# Patient Record
Sex: FEMALE | Race: WHITE | NOT HISPANIC OR LATINO | Employment: FULL TIME | URBAN - METROPOLITAN AREA
[De-identification: names, ages, dates, MRNs, and addresses within clinical notes are randomized per-mention and may not be internally consistent; named-entity substitution may affect disease eponyms.]

---

## 2017-06-06 ENCOUNTER — HOSPITAL ENCOUNTER (OUTPATIENT)
Dept: MAMMOGRAPHY | Facility: CLINIC | Age: 52
Discharge: HOME | End: 2017-06-06

## 2017-06-06 DIAGNOSIS — Z12.31 VISIT FOR SCREENING MAMMOGRAM: ICD-10-CM

## 2018-09-04 ENCOUNTER — HOSPITAL ENCOUNTER (OUTPATIENT)
Dept: MAMMOGRAPHY | Facility: CLINIC | Age: 53
Discharge: HOME | End: 2018-09-04

## 2018-09-04 DIAGNOSIS — Z12.31 VISIT FOR SCREENING MAMMOGRAM: ICD-10-CM

## 2019-09-12 ENCOUNTER — HOSPITAL ENCOUNTER (OUTPATIENT)
Dept: MAMMOGRAPHY | Facility: CLINIC | Age: 54
Discharge: HOME | End: 2019-09-12

## 2019-09-12 DIAGNOSIS — Z12.31 VISIT FOR SCREENING MAMMOGRAM: ICD-10-CM

## 2020-09-24 ENCOUNTER — HOSPITAL ENCOUNTER (OUTPATIENT)
Dept: MAMMOGRAPHY | Facility: CLINIC | Age: 55
Discharge: HOME | End: 2020-09-24

## 2020-09-24 DIAGNOSIS — Z12.31 VISIT FOR SCREENING MAMMOGRAM: ICD-10-CM

## 2021-07-22 ENCOUNTER — RECORDS - HEALTHEAST (OUTPATIENT)
Dept: SCHEDULING | Facility: CLINIC | Age: 56
End: 2021-07-22

## 2021-07-22 DIAGNOSIS — Z12.31 OTHER SCREENING MAMMOGRAM: ICD-10-CM

## 2021-10-27 ENCOUNTER — ANCILLARY PROCEDURE (OUTPATIENT)
Dept: MAMMOGRAPHY | Facility: CLINIC | Age: 56
End: 2021-10-27
Attending: INTERNAL MEDICINE
Payer: COMMERCIAL

## 2021-10-27 DIAGNOSIS — Z12.31 VISIT FOR SCREENING MAMMOGRAM: ICD-10-CM

## 2021-10-27 PROCEDURE — 77063 BREAST TOMOSYNTHESIS BI: CPT

## 2021-10-29 DIAGNOSIS — Z11.59 ENCOUNTER FOR SCREENING FOR OTHER VIRAL DISEASES: ICD-10-CM

## 2021-11-11 ENCOUNTER — LAB (OUTPATIENT)
Dept: LAB | Facility: CLINIC | Age: 56
End: 2021-11-11
Payer: COMMERCIAL

## 2021-11-11 DIAGNOSIS — Z11.59 ENCOUNTER FOR SCREENING FOR OTHER VIRAL DISEASES: ICD-10-CM

## 2021-11-11 PROCEDURE — U0003 INFECTIOUS AGENT DETECTION BY NUCLEIC ACID (DNA OR RNA); SEVERE ACUTE RESPIRATORY SYNDROME CORONAVIRUS 2 (SARS-COV-2) (CORONAVIRUS DISEASE [COVID-19]), AMPLIFIED PROBE TECHNIQUE, MAKING USE OF HIGH THROUGHPUT TECHNOLOGIES AS DESCRIBED BY CMS-2020-01-R: HCPCS

## 2021-11-11 PROCEDURE — U0005 INFEC AGEN DETEC AMPLI PROBE: HCPCS

## 2021-11-11 RX ORDER — PSEUDOEPHEDRINE HCL 30 MG
30 TABLET ORAL EVERY MORNING
COMMUNITY

## 2021-11-11 RX ORDER — PROGESTERONE 200 MG/1
200 CAPSULE ORAL AT BEDTIME
COMMUNITY
Start: 2021-10-15

## 2021-11-11 RX ORDER — LORATADINE 10 MG/1
10 TABLET ORAL DAILY
COMMUNITY

## 2021-11-11 RX ORDER — ASPIRIN 81 MG/1
81 TABLET, CHEWABLE ORAL
COMMUNITY

## 2021-11-11 RX ORDER — LISINOPRIL 20 MG/1
20 TABLET ORAL DAILY
COMMUNITY
Start: 2021-09-08

## 2021-11-11 ASSESSMENT — MIFFLIN-ST. JEOR: SCORE: 1305.76

## 2021-11-12 ENCOUNTER — ANESTHESIA EVENT (OUTPATIENT)
Dept: SURGERY | Facility: AMBULATORY SURGERY CENTER | Age: 56
End: 2021-11-12
Payer: COMMERCIAL

## 2021-11-12 LAB — SARS-COV-2 RNA RESP QL NAA+PROBE: NEGATIVE

## 2021-11-15 ENCOUNTER — HOSPITAL ENCOUNTER (OUTPATIENT)
Facility: AMBULATORY SURGERY CENTER | Age: 56
End: 2021-11-15
Attending: OBSTETRICS & GYNECOLOGY
Payer: COMMERCIAL

## 2021-11-15 ENCOUNTER — ANESTHESIA (OUTPATIENT)
Dept: SURGERY | Facility: AMBULATORY SURGERY CENTER | Age: 56
End: 2021-11-15
Payer: COMMERCIAL

## 2021-11-15 VITALS
RESPIRATION RATE: 16 BRPM | SYSTOLIC BLOOD PRESSURE: 131 MMHG | OXYGEN SATURATION: 97 % | BODY MASS INDEX: 27.31 KG/M2 | HEIGHT: 64 IN | HEART RATE: 67 BPM | TEMPERATURE: 97.6 F | WEIGHT: 160 LBS | DIASTOLIC BLOOD PRESSURE: 77 MMHG

## 2021-11-15 DIAGNOSIS — Z98.890 S/P D&C (STATUS POST DILATION AND CURETTAGE): Primary | ICD-10-CM

## 2021-11-15 DIAGNOSIS — N95.0 PMB (POSTMENOPAUSAL BLEEDING): ICD-10-CM

## 2021-11-15 LAB
GLUCOSE POCT: 117 MG/DL (ref 70–99)
GLUCOSE SERPL-MCNC: 132 MG/DL (ref 70–99)

## 2021-11-15 RX ORDER — OXYCODONE HYDROCHLORIDE 5 MG/1
5-10 TABLET ORAL EVERY 6 HOURS PRN
Qty: 4 TABLET | Refills: 0 | Status: SHIPPED | OUTPATIENT
Start: 2021-11-15 | End: 2022-01-12

## 2021-11-15 RX ORDER — BUPIVACAINE HYDROCHLORIDE 5 MG/ML
INJECTION, SOLUTION PERINEURAL PRN
Status: DISCONTINUED | OUTPATIENT
Start: 2021-11-15 | End: 2021-11-15 | Stop reason: HOSPADM

## 2021-11-15 RX ORDER — PROPOFOL 10 MG/ML
INJECTION, EMULSION INTRAVENOUS PRN
Status: DISCONTINUED | OUTPATIENT
Start: 2021-11-15 | End: 2021-11-15

## 2021-11-15 RX ORDER — IBUPROFEN 800 MG/1
800 TABLET, FILM COATED ORAL ONCE
Status: CANCELLED | OUTPATIENT
Start: 2021-11-15 | End: 2021-11-15

## 2021-11-15 RX ORDER — ACETAMINOPHEN 325 MG/1
975 TABLET ORAL ONCE
Status: CANCELLED | OUTPATIENT
Start: 2021-11-15 | End: 2021-11-15

## 2021-11-15 RX ORDER — SODIUM CHLORIDE, SODIUM LACTATE, POTASSIUM CHLORIDE, CALCIUM CHLORIDE 600; 310; 30; 20 MG/100ML; MG/100ML; MG/100ML; MG/100ML
INJECTION, SOLUTION INTRAVENOUS CONTINUOUS
Status: DISCONTINUED | OUTPATIENT
Start: 2021-11-15 | End: 2021-11-16 | Stop reason: HOSPADM

## 2021-11-15 RX ORDER — FENTANYL CITRATE 50 UG/ML
25 INJECTION, SOLUTION INTRAMUSCULAR; INTRAVENOUS
Status: DISCONTINUED | OUTPATIENT
Start: 2021-11-15 | End: 2021-11-16 | Stop reason: HOSPADM

## 2021-11-15 RX ORDER — MEPERIDINE HYDROCHLORIDE 25 MG/ML
12.5 INJECTION INTRAMUSCULAR; INTRAVENOUS; SUBCUTANEOUS
Status: DISCONTINUED | OUTPATIENT
Start: 2021-11-15 | End: 2021-11-16 | Stop reason: HOSPADM

## 2021-11-15 RX ORDER — ONDANSETRON 2 MG/ML
4 INJECTION INTRAMUSCULAR; INTRAVENOUS EVERY 30 MIN PRN
Status: DISCONTINUED | OUTPATIENT
Start: 2021-11-15 | End: 2021-11-16 | Stop reason: HOSPADM

## 2021-11-15 RX ORDER — LIDOCAINE HYDROCHLORIDE 20 MG/ML
INJECTION, SOLUTION INFILTRATION; PERINEURAL PRN
Status: DISCONTINUED | OUTPATIENT
Start: 2021-11-15 | End: 2021-11-15

## 2021-11-15 RX ORDER — ONDANSETRON 4 MG/1
4 TABLET, ORALLY DISINTEGRATING ORAL EVERY 30 MIN PRN
Status: DISCONTINUED | OUTPATIENT
Start: 2021-11-15 | End: 2021-11-16 | Stop reason: HOSPADM

## 2021-11-15 RX ORDER — PROPOFOL 10 MG/ML
INJECTION, EMULSION INTRAVENOUS CONTINUOUS PRN
Status: DISCONTINUED | OUTPATIENT
Start: 2021-11-15 | End: 2021-11-15

## 2021-11-15 RX ORDER — KETOROLAC TROMETHAMINE 15 MG/ML
INJECTION, SOLUTION INTRAMUSCULAR; INTRAVENOUS PRN
Status: DISCONTINUED | OUTPATIENT
Start: 2021-11-15 | End: 2021-11-15

## 2021-11-15 RX ORDER — FENTANYL CITRATE 50 UG/ML
25 INJECTION, SOLUTION INTRAMUSCULAR; INTRAVENOUS EVERY 5 MIN PRN
Status: DISCONTINUED | OUTPATIENT
Start: 2021-11-15 | End: 2021-11-16 | Stop reason: HOSPADM

## 2021-11-15 RX ORDER — ONDANSETRON 2 MG/ML
INJECTION INTRAMUSCULAR; INTRAVENOUS PRN
Status: DISCONTINUED | OUTPATIENT
Start: 2021-11-15 | End: 2021-11-15

## 2021-11-15 RX ORDER — DEXAMETHASONE SODIUM PHOSPHATE 4 MG/ML
INJECTION, SOLUTION INTRA-ARTICULAR; INTRALESIONAL; INTRAMUSCULAR; INTRAVENOUS; SOFT TISSUE PRN
Status: DISCONTINUED | OUTPATIENT
Start: 2021-11-15 | End: 2021-11-15

## 2021-11-15 RX ORDER — FENTANYL CITRATE 50 UG/ML
INJECTION, SOLUTION INTRAMUSCULAR; INTRAVENOUS PRN
Status: DISCONTINUED | OUTPATIENT
Start: 2021-11-15 | End: 2021-11-15

## 2021-11-15 RX ORDER — HYDROMORPHONE HCL IN WATER/PF 6 MG/30 ML
0.2 PATIENT CONTROLLED ANALGESIA SYRINGE INTRAVENOUS EVERY 5 MIN PRN
Status: DISCONTINUED | OUTPATIENT
Start: 2021-11-15 | End: 2021-11-16 | Stop reason: HOSPADM

## 2021-11-15 RX ORDER — LIDOCAINE 40 MG/G
CREAM TOPICAL
Status: DISCONTINUED | OUTPATIENT
Start: 2021-11-15 | End: 2021-11-16 | Stop reason: HOSPADM

## 2021-11-15 RX ORDER — OXYCODONE HYDROCHLORIDE 5 MG/1
5 TABLET ORAL EVERY 4 HOURS PRN
Status: DISCONTINUED | OUTPATIENT
Start: 2021-11-15 | End: 2021-11-16 | Stop reason: HOSPADM

## 2021-11-15 RX ADMIN — DEXAMETHASONE SODIUM PHOSPHATE 4 MG: 4 INJECTION, SOLUTION INTRA-ARTICULAR; INTRALESIONAL; INTRAMUSCULAR; INTRAVENOUS; SOFT TISSUE at 11:52

## 2021-11-15 RX ADMIN — PROPOFOL 30 MG: 10 INJECTION, EMULSION INTRAVENOUS at 11:48

## 2021-11-15 RX ADMIN — FENTANYL CITRATE 50 MCG: 50 INJECTION, SOLUTION INTRAMUSCULAR; INTRAVENOUS at 11:53

## 2021-11-15 RX ADMIN — ONDANSETRON 4 MG: 2 INJECTION INTRAMUSCULAR; INTRAVENOUS at 12:05

## 2021-11-15 RX ADMIN — SODIUM CHLORIDE, SODIUM LACTATE, POTASSIUM CHLORIDE, CALCIUM CHLORIDE: 600; 310; 30; 20 INJECTION, SOLUTION INTRAVENOUS at 11:13

## 2021-11-15 RX ADMIN — KETOROLAC TROMETHAMINE 15 MG: 15 INJECTION, SOLUTION INTRAMUSCULAR; INTRAVENOUS at 12:07

## 2021-11-15 RX ADMIN — PROPOFOL 200 MCG/KG/MIN: 10 INJECTION, EMULSION INTRAVENOUS at 11:48

## 2021-11-15 RX ADMIN — PROPOFOL 30 MG: 10 INJECTION, EMULSION INTRAVENOUS at 11:51

## 2021-11-15 RX ADMIN — LIDOCAINE HYDROCHLORIDE 40 MG: 20 INJECTION, SOLUTION INFILTRATION; PERINEURAL at 11:45

## 2021-11-15 RX ADMIN — FENTANYL CITRATE 50 MCG: 50 INJECTION, SOLUTION INTRAMUSCULAR; INTRAVENOUS at 11:45

## 2021-11-15 ASSESSMENT — MIFFLIN-ST. JEOR: SCORE: 1305.76

## 2021-11-15 ASSESSMENT — ENCOUNTER SYMPTOMS: SEIZURES: 0

## 2021-11-15 NOTE — ANESTHESIA PREPROCEDURE EVALUATION
Anesthesia Pre-Procedure Evaluation    Patient: Ava Miranda   MRN: [id number] : 1965        Preoperative Diagnosis: PMB (postmenopausal bleeding) [N95.0]    Procedure : Procedure(s):  HYSTEROSCOPY, WITH DILATION AND CURETTAGE OF UTERUS          Past Medical History:   Diagnosis Date     Arthritis      Diabetes (H)     Type 2     Hypertension      Motion sickness      PONV (postoperative nausea and vomiting)       Past Surgical History:   Procedure Laterality Date     HC REMOVAL OF TONSILS,<14 Y/O      Description: Tonsillectomy;  Recorded: 2012;     LAPAROSCOPY       SHOULDER SURGERY        Allergies   Allergen Reactions     Penicillins Unknown     Annotation: unsure of reaction        Social History     Tobacco Use     Smoking status: Never Smoker     Smokeless tobacco: Never Used   Substance Use Topics     Alcohol use: Not Currently      Wt Readings from Last 1 Encounters:   21 72.6 kg (160 lb)        Anesthesia Evaluation   Pt has had prior anesthetic.     No history of anesthetic complications       ROS/MED HX  ENT/Pulmonary:  - neg pulmonary ROS  (-) sleep apnea   Neurologic:  - neg neurologic ROS  (-) no seizures and no CVA   Cardiovascular: Comment:  TTE  1. Echo  2021 11:14:00 AM.     2. The left ventricular chamber dimension is  normal and systolic   function   is normal  with a 3D ejection fraction of 61%.     3. There is mildly increased left ventricular wall thickness.     4. The right ventricular chamber dimension is normal and systolic   function   is normal.     5. There is trivial aortic valve regurgitation.     6. There is trace mitral valve regurgitation.     7. There is trace tricuspid valve regurgitation.     8. There is trace pulmonic regurgitation.     9. The proximal ascending aorta is mildly dilated measuring 4.00 cm with an index of 2.19 cm/m2.     10. No gross pericardial effusion.       (+) hypertension----- (-) CAD   METS/Exercise Tolerance:      Hematologic:  - neg hematologic  ROS     Musculoskeletal:  - neg musculoskeletal ROS     GI/Hepatic:  - neg GI/hepatic ROS     Renal/Genitourinary:  - neg Renal ROS     Endo:     (+) type II DM, Not using insulin,     Psychiatric/Substance Use:  - neg psychiatric ROS     Infectious Disease:  - neg infectious disease ROS  (-) Recent Fever   Malignancy:       Other:            Physical Exam    Airway  airway exam normal      Mallampati: II   TM distance: > 3 FB   Neck ROM: full   Mouth opening: > 3 cm    Respiratory Devices and Support         Dental  no notable dental history         Cardiovascular   cardiovascular exam normal       Rhythm and rate: regular and normal     Pulmonary   pulmonary exam normal        breath sounds clear to auscultation           OUTSIDE LABS:  CBC: No results found for: WBC, HGB, HCT, PLT  BMP: No results found for: NA, POTASSIUM, CHLORIDE, CO2, BUN, CR, GLC  COAGS: No results found for: PTT, INR, FIBR  POC: No results found for: BGM, HCG, HCGS  HEPATIC: No results found for: ALBUMIN, PROTTOTAL, ALT, AST, GGT, ALKPHOS, BILITOTAL, BILIDIRECT, TERESA  OTHER: No results found for: PH, LACT, A1C, ASH, PHOS, MAG, LIPASE, AMYLASE, TSH, T4, T3, CRP, SED    Anesthesia Plan    ASA Status:  2   NPO Status:  NPO Appropriate    Anesthesia Type: MAC.              Consents    Anesthesia Plan(s) and associated risks, benefits, and realistic alternatives discussed. Questions answered and patient/representative(s) expressed understanding.     - Discussed with:  Patient         Postoperative Care    Pain management: Oral pain medications, Multi-modal analgesia, IV analgesics.   PONV prophylaxis: Ondansetron (or other 5HT-3)     Comments:                Jesús Garsia MD

## 2021-11-15 NOTE — OP NOTE
Operative Note  Hysteroscopy, D&C    Name:  Ava Miranda  Location: [facility] OR  Procedure Date:  11/15/2021  PCP:  Marilou Pop      [unfilled]    Pre-Procedure Diagnosis:  PMB (postmenopausal bleeding) [N95.0]     Post-Procedure Diagnosis:    SAME    Surgeon(s):  Ping Hutchins MD      Anesthesia Type:  Monitor Anesthesia Care    Findings:  EUA: uterus anteverted, mobile, normal small size. No adnexal masses. Hysteroscopy: sound 7.5cm. Cavity normal size and contour, thin atrophic lining. No polyps or myomas. 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  Happened out of the blue; they were driving out of town.  Went to the ER; hgb normal, US also normal anteverted uterus with 4mm lining.  Saw Dr Anderson when returned to town; EMB normal. Recommended proceeding with hysteroscopy, D&C for thorough evaluation, as she is interested in staying on HRT long term.  She is interested in ultimately proceeding with hysterectomy so as to prevent any further episodes.  Risks and benefits of surgery discussed and consent obtained to proceed.    Operative Report:    After administration of heavy IV sedation, the patient was positioned in dorsolithotomy position and exam under anesthesia performed with findings as noted above.  She was prepped and draped in the usual sterile fashion.  The bladder was drained with a straight catheter and a speculum placed into the vagina to visualize the cervix.  The cervix was grasped with a single tooth tenaculum on the anterior lip.  Paracervical block placed with 10cc .5% marcaine. The uterus sounded to 7.5cm.  The cervix was dilated with the graded Hegar dilators to #7.  The hysteroscope was inserted into the endometrial cavity and hysteroscopy performed with findings as noted above.  The hysteroscope was removed.  The endometrial cavity was sharply curetted with return of a small amount of tissue.  A nice gritty texture was noted throughout. The vagina was swabbed clean.  Hysteroscopy performed again showing normal post curettage sampling of the endometrium.  All was hemostatic.  The hysteroscope was removed..  The vagina swabbed clean.  The tenaculum was removed.  The tenaculum site was hemostatic.  The speculum was removed from the vagina.  She was taken out of the lithotomy position.  Sponge and instrument counts were correct.  There were no complications.  She awakened from the anesthesia without difficulty and was transferred to the recovery room in good condition.          Ping Hutchins MD     Date: 11/15/2021  Time: 12:13 PM

## 2021-11-15 NOTE — DISCHARGE INSTRUCTIONS
You received a medication called Toradol (a stronger IV ibuprofen) at 12pm. Do NOT take any Ibuprofen / Advil / Motrin / Aleve / Naproxen or products containing Ibuprofen until 6pm or later.  You may take Ibuprofen 600mg every 6 hours with food after 6pm.    You may alternate Ibuprofen with 2 Extra strength Tylenol, 1000mg, every 6 hours as needed for pain.  Do not exceed 4000mg in a 24 hour period.    Call Dr Hutchins with any questions or concerns, [phone].

## 2021-11-15 NOTE — ANESTHESIA CARE TRANSFER NOTE
Patient: Ava Miranda    Procedure: Procedure(s):  HYSTEROSCOPY, WITH DILATION AND CURETTAGE OF UTERUS       Diagnosis: PMB (postmenopausal bleeding) [N95.0]  Diagnosis Additional Information: No value filed.    Anesthesia Type:   MAC     Note:    Oropharynx: oropharynx clear of all foreign objects and spontaneously breathing  Level of Consciousness: awake  Oxygen Supplementation: room air  Level of Supplemental Oxygen (L/min / FiO2): 0  Independent Airway: airway patency satisfactory and stable  Dentition: dentition unchanged  Vital Signs Stable: post-procedure vital signs reviewed and stable  Report to RN Given: handoff report given  Patient transferred to: Phase II    Handoff Report: Identifed the Patient, Identified the Reponsible Provider, Reviewed the pertinent medical history, Discussed the surgical course, Reviewed Intra-OP anesthesia mangement and issues during anesthesia, Set expectations for post-procedure period and Allowed opportunity for questions and acknowledgement of understanding      Vitals:  Vitals Value Taken Time   /81 11/15/21 1217   Temp 97.6  F (36.4  C) 11/15/21 1217   Pulse 77 11/15/21 1217   Resp 16 11/15/21 1217   SpO2 95 % 11/15/21 1217       Electronically Signed By: TIMOTHY SULLIVAN CRNA  November 15, 2021  12:19 PM

## 2021-11-15 NOTE — ANESTHESIA POSTPROCEDURE EVALUATION
Patient: Ava Miranda    Procedure: Procedure(s):  HYSTEROSCOPY, WITH DILATION AND CURETTAGE OF UTERUS       Diagnosis:PMB (postmenopausal bleeding) [N95.0]  Diagnosis Additional Information: No value filed.    Anesthesia Type:  MAC    Note:  Disposition: Outpatient   Postop Pain Control: Uneventful            Sign Out: Well controlled pain   PONV: No   Neuro/Psych: Uneventful            Sign Out: Acceptable/Baseline neuro status   Airway/Respiratory: Uneventful            Sign Out: Acceptable/Baseline resp. status   CV/Hemodynamics: Uneventful            Sign Out: Acceptable CV status; No obvious hypovolemia; No obvious fluid overload   Other NRE: NONE   DID A NON-ROUTINE EVENT OCCUR? No           Last vitals:  Vitals Value Taken Time   /77 11/15/21 1300   Temp 97.6  F (36.4  C) 11/15/21 1217   Pulse 65 11/15/21 1302   Resp 16 11/15/21 1300   SpO2 96 % 11/15/21 1302   Vitals shown include unvalidated device data.    Electronically Signed By: Bethany Grullon MD  November 15, 2021  1:12 PM

## 2021-12-05 ENCOUNTER — HEALTH MAINTENANCE LETTER (OUTPATIENT)
Age: 56
End: 2021-12-05

## 2021-12-13 ENCOUNTER — TRANSFERRED RECORDS (OUTPATIENT)
Dept: HEALTH INFORMATION MANAGEMENT | Facility: CLINIC | Age: 56
End: 2021-12-13

## 2021-12-20 DIAGNOSIS — Z11.59 ENCOUNTER FOR SCREENING FOR OTHER VIRAL DISEASES: ICD-10-CM

## 2022-01-10 ENCOUNTER — TRANSFERRED RECORDS (OUTPATIENT)
Dept: MULTI SPECIALTY CLINIC | Facility: CLINIC | Age: 57
End: 2022-01-10

## 2022-01-10 ENCOUNTER — LAB (OUTPATIENT)
Dept: LAB | Facility: CLINIC | Age: 57
End: 2022-01-10
Attending: OBSTETRICS & GYNECOLOGY
Payer: COMMERCIAL

## 2022-01-10 DIAGNOSIS — Z11.59 ENCOUNTER FOR SCREENING FOR OTHER VIRAL DISEASES: ICD-10-CM

## 2022-01-10 LAB
CREATININE (EXTERNAL): 0.63 MG/DL (ref 0.55–1.02)
GFR ESTIMATED (EXTERNAL): >60 ML/MIN/1.73M2
GLUCOSE (EXTERNAL): 122 MG/DL (ref 70–100)
POTASSIUM (EXTERNAL): 4.1 MMOL/L (ref 3.5–5.1)

## 2022-01-10 PROCEDURE — U0005 INFEC AGEN DETEC AMPLI PROBE: HCPCS

## 2022-01-10 PROCEDURE — U0003 INFECTIOUS AGENT DETECTION BY NUCLEIC ACID (DNA OR RNA); SEVERE ACUTE RESPIRATORY SYNDROME CORONAVIRUS 2 (SARS-COV-2) (CORONAVIRUS DISEASE [COVID-19]), AMPLIFIED PROBE TECHNIQUE, MAKING USE OF HIGH THROUGHPUT TECHNOLOGIES AS DESCRIBED BY CMS-2020-01-R: HCPCS

## 2022-01-11 LAB — SARS-COV-2 RNA RESP QL NAA+PROBE: NEGATIVE

## 2022-01-14 ENCOUNTER — ANESTHESIA (OUTPATIENT)
Dept: SURGERY | Facility: CLINIC | Age: 57
End: 2022-01-14
Payer: COMMERCIAL

## 2022-01-14 ENCOUNTER — HOSPITAL ENCOUNTER (OUTPATIENT)
Facility: CLINIC | Age: 57
Discharge: HOME | End: 2022-01-14
Attending: OBSTETRICS & GYNECOLOGY | Admitting: OBSTETRICS & GYNECOLOGY
Payer: COMMERCIAL

## 2022-01-14 ENCOUNTER — ANESTHESIA EVENT (OUTPATIENT)
Dept: SURGERY | Facility: CLINIC | Age: 57
End: 2022-01-14
Payer: COMMERCIAL

## 2022-01-14 VITALS
HEIGHT: 64 IN | SYSTOLIC BLOOD PRESSURE: 121 MMHG | OXYGEN SATURATION: 100 % | BODY MASS INDEX: 26.1 KG/M2 | WEIGHT: 152.9 LBS | RESPIRATION RATE: 16 BRPM | TEMPERATURE: 98.2 F | DIASTOLIC BLOOD PRESSURE: 72 MMHG | HEART RATE: 87 BPM

## 2022-01-14 DIAGNOSIS — Z90.710 S/P HYSTERECTOMY: Primary | ICD-10-CM

## 2022-01-14 LAB
ABO/RH(D): NORMAL
ANTIBODY SCREEN: NEGATIVE
GLUCOSE BLDC GLUCOMTR-MCNC: 129 MG/DL (ref 70–99)
GLUCOSE BLDC GLUCOMTR-MCNC: 181 MG/DL (ref 70–99)
HCG UR QL: NEGATIVE
HGB BLD-MCNC: 13.9 G/DL (ref 11.7–15.7)
SPECIMEN EXPIRATION DATE: NORMAL

## 2022-01-14 PROCEDURE — 272N000001 HC OR GENERAL SUPPLY STERILE: Performed by: OBSTETRICS & GYNECOLOGY

## 2022-01-14 PROCEDURE — 250N000012 HC RX MED GY IP 250 OP 636 PS 637: Performed by: ANESTHESIOLOGY

## 2022-01-14 PROCEDURE — 250N000025 HC SEVOFLURANE, PER MIN: Performed by: OBSTETRICS & GYNECOLOGY

## 2022-01-14 PROCEDURE — 250N000011 HC RX IP 250 OP 636: Performed by: ANESTHESIOLOGY

## 2022-01-14 PROCEDURE — 250N000011 HC RX IP 250 OP 636: Performed by: NURSE ANESTHETIST, CERTIFIED REGISTERED

## 2022-01-14 PROCEDURE — 250N000013 HC RX MED GY IP 250 OP 250 PS 637: Performed by: OBSTETRICS & GYNECOLOGY

## 2022-01-14 PROCEDURE — 710N000012 HC RECOVERY PHASE 2, PER MINUTE: Performed by: OBSTETRICS & GYNECOLOGY

## 2022-01-14 PROCEDURE — 85018 HEMOGLOBIN: CPT | Performed by: OBSTETRICS & GYNECOLOGY

## 2022-01-14 PROCEDURE — 999N000141 HC STATISTIC PRE-PROCEDURE NURSING ASSESSMENT: Performed by: OBSTETRICS & GYNECOLOGY

## 2022-01-14 PROCEDURE — 250N000009 HC RX 250: Performed by: ANESTHESIOLOGY

## 2022-01-14 PROCEDURE — 88307 TISSUE EXAM BY PATHOLOGIST: CPT | Mod: TC | Performed by: OBSTETRICS & GYNECOLOGY

## 2022-01-14 PROCEDURE — 360N000077 HC SURGERY LEVEL 4, PER MIN: Performed by: OBSTETRICS & GYNECOLOGY

## 2022-01-14 PROCEDURE — 258N000003 HC RX IP 258 OP 636: Performed by: ANESTHESIOLOGY

## 2022-01-14 PROCEDURE — 258N000003 HC RX IP 258 OP 636: Performed by: NURSE ANESTHETIST, CERTIFIED REGISTERED

## 2022-01-14 PROCEDURE — 370N000017 HC ANESTHESIA TECHNICAL FEE, PER MIN: Performed by: OBSTETRICS & GYNECOLOGY

## 2022-01-14 PROCEDURE — 250N000013 HC RX MED GY IP 250 OP 250 PS 637: Performed by: ANESTHESIOLOGY

## 2022-01-14 PROCEDURE — 86901 BLOOD TYPING SEROLOGIC RH(D): CPT | Performed by: OBSTETRICS & GYNECOLOGY

## 2022-01-14 PROCEDURE — 250N000009 HC RX 250: Performed by: NURSE ANESTHETIST, CERTIFIED REGISTERED

## 2022-01-14 PROCEDURE — 82962 GLUCOSE BLOOD TEST: CPT

## 2022-01-14 PROCEDURE — 81025 URINE PREGNANCY TEST: CPT | Performed by: OBSTETRICS & GYNECOLOGY

## 2022-01-14 PROCEDURE — 250N000011 HC RX IP 250 OP 636: Performed by: OBSTETRICS & GYNECOLOGY

## 2022-01-14 PROCEDURE — 36415 COLL VENOUS BLD VENIPUNCTURE: CPT | Performed by: OBSTETRICS & GYNECOLOGY

## 2022-01-14 PROCEDURE — 710N000010 HC RECOVERY PHASE 1, LEVEL 2, PER MIN: Performed by: OBSTETRICS & GYNECOLOGY

## 2022-01-14 RX ORDER — ONDANSETRON 2 MG/ML
4 INJECTION INTRAMUSCULAR; INTRAVENOUS EVERY 30 MIN PRN
Status: COMPLETED | OUTPATIENT
Start: 2022-01-14 | End: 2022-01-14

## 2022-01-14 RX ORDER — FENTANYL CITRATE 50 UG/ML
INJECTION, SOLUTION INTRAMUSCULAR; INTRAVENOUS PRN
Status: DISCONTINUED | OUTPATIENT
Start: 2022-01-14 | End: 2022-01-14

## 2022-01-14 RX ORDER — DEXAMETHASONE SODIUM PHOSPHATE 10 MG/ML
INJECTION, SOLUTION INTRAMUSCULAR; INTRAVENOUS PRN
Status: DISCONTINUED | OUTPATIENT
Start: 2022-01-14 | End: 2022-01-14

## 2022-01-14 RX ORDER — ACETAMINOPHEN 325 MG/1
975 TABLET ORAL ONCE
Status: COMPLETED | OUTPATIENT
Start: 2022-01-14 | End: 2022-01-14

## 2022-01-14 RX ORDER — MAGNESIUM SULFATE 4 G/50ML
4 INJECTION INTRAVENOUS ONCE
Status: COMPLETED | OUTPATIENT
Start: 2022-01-14 | End: 2022-01-14

## 2022-01-14 RX ORDER — ACETAMINOPHEN 325 MG/1
975 TABLET ORAL ONCE
Status: DISCONTINUED | OUTPATIENT
Start: 2022-01-14 | End: 2022-01-14 | Stop reason: HOSPADM

## 2022-01-14 RX ORDER — IBUPROFEN 400 MG/1
800 TABLET, FILM COATED ORAL ONCE
Status: DISCONTINUED | OUTPATIENT
Start: 2022-01-14 | End: 2022-01-14 | Stop reason: HOSPADM

## 2022-01-14 RX ORDER — HALOPERIDOL 5 MG/ML
1 INJECTION INTRAMUSCULAR
Status: COMPLETED | OUTPATIENT
Start: 2022-01-14 | End: 2022-01-14

## 2022-01-14 RX ORDER — PROPOFOL 10 MG/ML
INJECTION, EMULSION INTRAVENOUS PRN
Status: DISCONTINUED | OUTPATIENT
Start: 2022-01-14 | End: 2022-01-14

## 2022-01-14 RX ORDER — FENTANYL CITRATE 50 UG/ML
25 INJECTION, SOLUTION INTRAMUSCULAR; INTRAVENOUS EVERY 5 MIN PRN
Status: DISCONTINUED | OUTPATIENT
Start: 2022-01-14 | End: 2022-01-14 | Stop reason: HOSPADM

## 2022-01-14 RX ORDER — EPHEDRINE SULFATE 50 MG/ML
INJECTION, SOLUTION INTRAMUSCULAR; INTRAVENOUS; SUBCUTANEOUS PRN
Status: DISCONTINUED | OUTPATIENT
Start: 2022-01-14 | End: 2022-01-14

## 2022-01-14 RX ORDER — OXYCODONE HYDROCHLORIDE 5 MG/1
5 TABLET ORAL EVERY 4 HOURS PRN
Status: DISCONTINUED | OUTPATIENT
Start: 2022-01-14 | End: 2022-01-14 | Stop reason: HOSPADM

## 2022-01-14 RX ORDER — LORAZEPAM 2 MG/ML
.5-1 INJECTION INTRAMUSCULAR
Status: DISCONTINUED | OUTPATIENT
Start: 2022-01-14 | End: 2022-01-14 | Stop reason: HOSPADM

## 2022-01-14 RX ORDER — HYDROMORPHONE HCL IN WATER/PF 6 MG/30 ML
0.4 PATIENT CONTROLLED ANALGESIA SYRINGE INTRAVENOUS EVERY 5 MIN PRN
Status: DISCONTINUED | OUTPATIENT
Start: 2022-01-14 | End: 2022-01-14 | Stop reason: HOSPADM

## 2022-01-14 RX ORDER — IBUPROFEN 800 MG/1
800 TABLET, FILM COATED ORAL EVERY 6 HOURS PRN
Qty: 30 TABLET | Refills: 0 | Status: SHIPPED | OUTPATIENT
Start: 2022-01-14

## 2022-01-14 RX ORDER — DIPHENHYDRAMINE HYDROCHLORIDE 50 MG/ML
INJECTION INTRAMUSCULAR; INTRAVENOUS PRN
Status: DISCONTINUED | OUTPATIENT
Start: 2022-01-14 | End: 2022-01-14

## 2022-01-14 RX ORDER — APREPITANT 40 MG/1
40 CAPSULE ORAL DAILY
Status: DISCONTINUED | OUTPATIENT
Start: 2022-01-14 | End: 2022-01-14

## 2022-01-14 RX ORDER — FENTANYL CITRATE 50 UG/ML
25 INJECTION, SOLUTION INTRAMUSCULAR; INTRAVENOUS
Status: DISCONTINUED | OUTPATIENT
Start: 2022-01-14 | End: 2022-01-14 | Stop reason: HOSPADM

## 2022-01-14 RX ORDER — APREPITANT 40 MG/1
40 CAPSULE ORAL ONCE
Status: COMPLETED | OUTPATIENT
Start: 2022-01-14 | End: 2022-01-14

## 2022-01-14 RX ORDER — OXYCODONE HYDROCHLORIDE 5 MG/1
5-10 TABLET ORAL EVERY 6 HOURS PRN
Qty: 12 TABLET | Refills: 0 | Status: SHIPPED | OUTPATIENT
Start: 2022-01-14

## 2022-01-14 RX ORDER — SODIUM CHLORIDE, SODIUM LACTATE, POTASSIUM CHLORIDE, CALCIUM CHLORIDE 600; 310; 30; 20 MG/100ML; MG/100ML; MG/100ML; MG/100ML
INJECTION, SOLUTION INTRAVENOUS CONTINUOUS
Status: DISCONTINUED | OUTPATIENT
Start: 2022-01-14 | End: 2022-01-14 | Stop reason: HOSPADM

## 2022-01-14 RX ORDER — OXYCODONE HYDROCHLORIDE 5 MG/1
5 TABLET ORAL
Status: DISCONTINUED | OUTPATIENT
Start: 2022-01-14 | End: 2022-01-14 | Stop reason: HOSPADM

## 2022-01-14 RX ORDER — CEFAZOLIN SODIUM 2 G/100ML
2 INJECTION, SOLUTION INTRAVENOUS
Status: COMPLETED | OUTPATIENT
Start: 2022-01-14 | End: 2022-01-14

## 2022-01-14 RX ORDER — ACETAMINOPHEN 325 MG/1
975 TABLET ORAL EVERY 6 HOURS PRN
Qty: 30 TABLET | Refills: 0 | Status: SHIPPED | OUTPATIENT
Start: 2022-01-14

## 2022-01-14 RX ORDER — MEPERIDINE HYDROCHLORIDE 25 MG/ML
12.5 INJECTION INTRAMUSCULAR; INTRAVENOUS; SUBCUTANEOUS
Status: DISCONTINUED | OUTPATIENT
Start: 2022-01-14 | End: 2022-01-14 | Stop reason: HOSPADM

## 2022-01-14 RX ORDER — SCOLOPAMINE TRANSDERMAL SYSTEM 1 MG/1
1 PATCH, EXTENDED RELEASE TRANSDERMAL
Status: DISCONTINUED | OUTPATIENT
Start: 2022-01-14 | End: 2022-01-14 | Stop reason: HOSPADM

## 2022-01-14 RX ORDER — ONDANSETRON 4 MG/1
4 TABLET, ORALLY DISINTEGRATING ORAL EVERY 30 MIN PRN
Status: COMPLETED | OUTPATIENT
Start: 2022-01-14 | End: 2022-01-14

## 2022-01-14 RX ORDER — LIDOCAINE 40 MG/G
CREAM TOPICAL
Status: DISCONTINUED | OUTPATIENT
Start: 2022-01-14 | End: 2022-01-14 | Stop reason: HOSPADM

## 2022-01-14 RX ORDER — KETOROLAC TROMETHAMINE 30 MG/ML
15 INJECTION, SOLUTION INTRAMUSCULAR; INTRAVENOUS EVERY 6 HOURS PRN
Status: DISCONTINUED | OUTPATIENT
Start: 2022-01-14 | End: 2022-01-14 | Stop reason: HOSPADM

## 2022-01-14 RX ORDER — AMOXICILLIN 250 MG
1-2 CAPSULE ORAL 2 TIMES DAILY
Qty: 20 TABLET | Refills: 0 | Status: SHIPPED | OUTPATIENT
Start: 2022-01-14

## 2022-01-14 RX ORDER — CEFAZOLIN SODIUM 2 G/100ML
2 INJECTION, SOLUTION INTRAVENOUS SEE ADMIN INSTRUCTIONS
Status: DISCONTINUED | OUTPATIENT
Start: 2022-01-14 | End: 2022-01-14 | Stop reason: HOSPADM

## 2022-01-14 RX ORDER — METFORMIN HCL 500 MG
1000 TABLET, EXTENDED RELEASE 24 HR ORAL
COMMUNITY

## 2022-01-14 RX ADMIN — APREPITANT 40 MG: 40 CAPSULE ORAL at 09:56

## 2022-01-14 RX ADMIN — ROCURONIUM BROMIDE 50 MG: 10 INJECTION, SOLUTION INTRAVENOUS at 11:28

## 2022-01-14 RX ADMIN — ACETAMINOPHEN 975 MG: 325 TABLET ORAL at 10:00

## 2022-01-14 RX ADMIN — HALOPERIDOL LACTATE 1 MG: 5 INJECTION, SOLUTION INTRAMUSCULAR at 16:04

## 2022-01-14 RX ADMIN — PHENYLEPHRINE HYDROCHLORIDE 100 MCG: 10 INJECTION INTRAVENOUS at 11:42

## 2022-01-14 RX ADMIN — ROCURONIUM BROMIDE 10 MG: 10 INJECTION, SOLUTION INTRAVENOUS at 11:58

## 2022-01-14 RX ADMIN — PHENYLEPHRINE HYDROCHLORIDE 100 MCG: 10 INJECTION INTRAVENOUS at 11:49

## 2022-01-14 RX ADMIN — ROCURONIUM BROMIDE 10 MG: 10 INJECTION, SOLUTION INTRAVENOUS at 12:39

## 2022-01-14 RX ADMIN — OXYCODONE HYDROCHLORIDE 5 MG: 5 TABLET ORAL at 16:59

## 2022-01-14 RX ADMIN — PROPOFOL 150 MG: 10 INJECTION, EMULSION INTRAVENOUS at 11:29

## 2022-01-14 RX ADMIN — Medication 10 MG: at 12:03

## 2022-01-14 RX ADMIN — PHENYLEPHRINE HYDROCHLORIDE 100 MCG: 10 INJECTION INTRAVENOUS at 12:05

## 2022-01-14 RX ADMIN — FENTANYL CITRATE 25 MCG: 50 INJECTION INTRAMUSCULAR; INTRAVENOUS at 14:45

## 2022-01-14 RX ADMIN — DIPHENHYDRAMINE HYDROCHLORIDE 25 MG: 50 INJECTION INTRAMUSCULAR; INTRAVENOUS at 12:00

## 2022-01-14 RX ADMIN — MAGNESIUM SULFATE HEPTAHYDRATE 4 G: 80 INJECTION, SOLUTION INTRAVENOUS at 10:04

## 2022-01-14 RX ADMIN — PHENYLEPHRINE HYDROCHLORIDE 100 MCG: 10 INJECTION INTRAVENOUS at 13:10

## 2022-01-14 RX ADMIN — ONDANSETRON 4 MG: 2 INJECTION INTRAMUSCULAR; INTRAVENOUS at 11:28

## 2022-01-14 RX ADMIN — PHENYLEPHRINE HYDROCHLORIDE 100 MCG: 10 INJECTION INTRAVENOUS at 11:58

## 2022-01-14 RX ADMIN — PHENYLEPHRINE HYDROCHLORIDE 100 MCG: 10 INJECTION INTRAVENOUS at 13:04

## 2022-01-14 RX ADMIN — FENTANYL CITRATE 25 MCG: 50 INJECTION INTRAMUSCULAR; INTRAVENOUS at 14:28

## 2022-01-14 RX ADMIN — PHENYLEPHRINE HYDROCHLORIDE 100 MCG: 10 INJECTION INTRAVENOUS at 11:46

## 2022-01-14 RX ADMIN — MIDAZOLAM 2 MG: 1 INJECTION INTRAMUSCULAR; INTRAVENOUS at 11:20

## 2022-01-14 RX ADMIN — FENTANYL CITRATE 50 MCG: 50 INJECTION, SOLUTION INTRAMUSCULAR; INTRAVENOUS at 13:23

## 2022-01-14 RX ADMIN — LIDOCAINE HYDROCHLORIDE 30 MG: 10 INJECTION, SOLUTION INFILTRATION; PERINEURAL at 11:26

## 2022-01-14 RX ADMIN — SODIUM CHLORIDE, POTASSIUM CHLORIDE, SODIUM LACTATE AND CALCIUM CHLORIDE: 600; 310; 30; 20 INJECTION, SOLUTION INTRAVENOUS at 11:20

## 2022-01-14 RX ADMIN — SUGAMMADEX 200 MG: 100 INJECTION, SOLUTION INTRAVENOUS at 13:43

## 2022-01-14 RX ADMIN — FENTANYL CITRATE 25 MCG: 50 INJECTION INTRAMUSCULAR; INTRAVENOUS at 16:01

## 2022-01-14 RX ADMIN — FENTANYL CITRATE 50 MCG: 50 INJECTION, SOLUTION INTRAMUSCULAR; INTRAVENOUS at 12:16

## 2022-01-14 RX ADMIN — Medication 10 MG: at 11:54

## 2022-01-14 RX ADMIN — SCOPALAMINE 1 PATCH: 1 PATCH, EXTENDED RELEASE TRANSDERMAL at 11:01

## 2022-01-14 RX ADMIN — ACETAMINOPHEN 975 MG: 325 TABLET ORAL at 16:59

## 2022-01-14 RX ADMIN — PHENYLEPHRINE HYDROCHLORIDE 100 MCG: 10 INJECTION INTRAVENOUS at 11:56

## 2022-01-14 RX ADMIN — FENTANYL CITRATE 25 MCG: 50 INJECTION INTRAMUSCULAR; INTRAVENOUS at 14:35

## 2022-01-14 RX ADMIN — DEXAMETHASONE SODIUM PHOSPHATE 4 MG: 10 INJECTION, SOLUTION INTRAMUSCULAR; INTRAVENOUS at 11:28

## 2022-01-14 RX ADMIN — PHENYLEPHRINE HYDROCHLORIDE 100 MCG: 10 INJECTION INTRAVENOUS at 12:30

## 2022-01-14 RX ADMIN — SODIUM CHLORIDE, POTASSIUM CHLORIDE, SODIUM LACTATE AND CALCIUM CHLORIDE: 600; 310; 30; 20 INJECTION, SOLUTION INTRAVENOUS at 11:51

## 2022-01-14 RX ADMIN — SODIUM CHLORIDE, POTASSIUM CHLORIDE, SODIUM LACTATE AND CALCIUM CHLORIDE: 600; 310; 30; 20 INJECTION, SOLUTION INTRAVENOUS at 09:52

## 2022-01-14 RX ADMIN — ONDANSETRON 4 MG: 2 INJECTION INTRAMUSCULAR; INTRAVENOUS at 13:29

## 2022-01-14 RX ADMIN — PHENYLEPHRINE HYDROCHLORIDE 100 MCG: 10 INJECTION INTRAVENOUS at 12:01

## 2022-01-14 RX ADMIN — PHENYLEPHRINE HYDROCHLORIDE 100 MCG: 10 INJECTION INTRAVENOUS at 11:52

## 2022-01-14 RX ADMIN — PHENYLEPHRINE HYDROCHLORIDE 100 MCG: 10 INJECTION INTRAVENOUS at 11:41

## 2022-01-14 RX ADMIN — FENTANYL CITRATE 25 MCG: 50 INJECTION INTRAMUSCULAR; INTRAVENOUS at 14:40

## 2022-01-14 RX ADMIN — PROPOFOL 50 MCG/KG/MIN: 10 INJECTION, EMULSION INTRAVENOUS at 11:32

## 2022-01-14 RX ADMIN — FENTANYL CITRATE 100 MCG: 50 INJECTION, SOLUTION INTRAMUSCULAR; INTRAVENOUS at 11:26

## 2022-01-14 RX ADMIN — PHENYLEPHRINE HYDROCHLORIDE 100 MCG: 10 INJECTION INTRAVENOUS at 11:44

## 2022-01-14 RX ADMIN — Medication 5 MG: at 12:44

## 2022-01-14 RX ADMIN — SODIUM CHLORIDE, POTASSIUM CHLORIDE, SODIUM LACTATE AND CALCIUM CHLORIDE: 600; 310; 30; 20 INJECTION, SOLUTION INTRAVENOUS at 12:50

## 2022-01-14 RX ADMIN — KETOROLAC TROMETHAMINE 15 MG: 30 INJECTION, SOLUTION INTRAMUSCULAR at 13:41

## 2022-01-14 RX ADMIN — CEFAZOLIN SODIUM 2 G: 2 INJECTION, SOLUTION INTRAVENOUS at 11:20

## 2022-01-14 ASSESSMENT — MIFFLIN-ST. JEOR: SCORE: 1273.55

## 2022-01-14 ASSESSMENT — ENCOUNTER SYMPTOMS: SEIZURES: 0

## 2022-01-14 NOTE — PHARMACY-ADMISSION MEDICATION HISTORY
Pharmacy Note - Admission Medication History    Pertinent Provider Information:    ______________________________________________________________________    Prior To Admission (PTA) med list completed and updated in EMR.       PTA Med List   Medication Sig Last Dose     aspirin (ASA) 81 MG chewable tablet Take 81 mg by mouth 1/7/2022     lisinopril (ZESTRIL) 20 MG tablet Take 20 mg by mouth daily  1/14/2022 at 0800     loratadine (CLARITIN) 10 MG tablet Take 10 mg by mouth daily  1/14/2022 at 0800     metFORMIN (GLUCOPHAGE-XR) 500 MG 24 hr tablet Take 1,000 mg by mouth daily (with dinner) 1/13/2022 at Unknown time     progesterone (PROMETRIUM) 200 MG capsule Take 200 mg by mouth At Bedtime  1/13/2022 at Unknown time     pseudoePHEDrine (SUDAFED) 30 MG tablet Take 30 mg by mouth every morning  1/13/2022 at Unknown time       Information source(s): Patient, Facility ([facility] medication list/MAR and CareEverywhere/SureScripts    Method of interview communication: in-person    Patient was asked about OTC/herbal products specifically.  PTA med list reflects this.    Based on the pharmacist's assessment, the PTA med list information appears reliable    Allergies were reviewed, assessed, and updated with the patient.      Patient does not anticipate needing any multi-use medications during admission.     Thank you for the opportunity to participate in the care of this patient.      Chris Parmar RPH     1/14/2022     9:42 AM

## 2022-01-14 NOTE — ANESTHESIA POSTPROCEDURE EVALUATION
Patient: Ava Miranda    Procedure: Procedure(s):  TOTAL LAPAROSCOPIC HYSTERECTOMY, BILATERAL SALPINGO-OPHORECTOMY,  CYSTOSCOPY       Diagnosis:Post-menopausal bleeding [N95.0]  Diagnosis Additional Information: No value filed.    Anesthesia Type:  General    Note:  Disposition: Inpatient   Postop Pain Control: Uneventful            Sign Out: Well controlled pain   PONV: No   Neuro/Psych: Uneventful            Sign Out: Acceptable/Baseline neuro status   Airway/Respiratory: Uneventful            Sign Out: Acceptable/Baseline resp. status   CV/Hemodynamics: Uneventful            Sign Out: Acceptable CV status; No obvious hypovolemia; No obvious fluid overload   Other NRE: NONE   DID A NON-ROUTINE EVENT OCCUR? No    Event details/Postop Comments:  Sleepy currently           Last vitals:  Vitals Value Taken Time   /57 01/14/22 1450   Temp 36.8  C (98.2  F) 01/14/22 1440   Pulse 88 01/14/22 1454   Resp 11 01/14/22 1454   SpO2 93 % 01/14/22 1454   Vitals shown include unvalidated device data.    Electronically Signed By: Jagdish Woods MD  January 14, 2022  2:56 PM

## 2022-01-14 NOTE — ANESTHESIA CARE TRANSFER NOTE
Patient: Ava Miranda    Procedure: Procedure(s):  TOTAL LAPAROSCOPIC HYSTERECTOMY, BILATERAL SALPINGO-OPHORECTOMY,  CYSTOSCOPY       Diagnosis: Post-menopausal bleeding [N95.0]  Diagnosis Additional Information: No value filed.    Anesthesia Type:   General     Note:    Oropharynx: oropharynx clear of all foreign objects and spontaneously breathing  Level of Consciousness: drowsy  Oxygen Supplementation: face mask  Level of Supplemental Oxygen (L/min / FiO2): 8  Independent Airway: airway patency satisfactory and stable  Dentition: dentition unchanged  Vital Signs Stable: post-procedure vital signs reviewed and stable  Report to RN Given: handoff report given  Patient transferred to: PACU    Handoff Report: Identifed the Patient, Identified the Reponsible Provider, Reviewed the pertinent medical history, Discussed the surgical course, Reviewed Intra-OP anesthesia mangement and issues during anesthesia, Set expectations for post-procedure period and Allowed opportunity for questions and acknowledgement of understanding      Vitals:  Vitals Value Taken Time   /63 01/14/22 1406   Temp 37.1  C (98.8  F) 01/14/22 1405   Pulse 92 01/14/22 1410   Resp 14 01/14/22 1410   SpO2 100 % 01/14/22 1410   Vitals shown include unvalidated device data.    Electronically Signed By: TIMOTHY Barrow CRNA  January 14, 2022  2:12 PM

## 2022-01-14 NOTE — ANESTHESIA PROCEDURE NOTES
Airway       Patient location during procedure: OR       Procedure Start/Stop Times: 1/14/2022 11:31 AM  Staff -        CRNA: Ellis Montes De Oca APRN CRNA       Performed By: CRNA  Consent for Airway        Urgency: elective  Indications and Patient Condition       Indications for airway management: antony-procedural       Induction type:intravenous       Mask difficulty assessment: 1 - vent by mask    Final Airway Details       Final airway type: endotracheal airway       Successful airway: ETT - single  Endotracheal Airway Details        ETT size (mm): 7.0       Cuffed: yes       Successful intubation technique: direct laryngoscopy       DL Blade Type: Mcnulty 2       Grade View of Cords: 1       Adjucts: stylet and tooth guard       Position: Right       Measured from: lips       Secured at (cm): 22       Bite block used: None    Post intubation assessment        Placement verified by: capnometry, equal breath sounds and chest rise        Number of attempts at approach: 1       Number of other approaches attempted: 0       Secured with: silk tape       Ease of procedure: easy       Dentition: Intact

## 2022-01-14 NOTE — ANESTHESIA PREPROCEDURE EVALUATION
Anesthesia Pre-Procedure Evaluation    Patient: Ava Miranda   MRN: [id number] : 1965        Preoperative Diagnosis: PMB (postmenopausal bleeding) [N95.0]    Procedure : Procedure(s):  HYSTEROSCOPY, WITH DILATION AND CURETTAGE OF UTERUS          Past Medical History:   Diagnosis Date     Arthritis      Diabetes (H)     Type 2     Hypertension      Motion sickness      PONV (postoperative nausea and vomiting)       Past Surgical History:   Procedure Laterality Date     DILATION AND CURETTAGE, OPERATIVE HYSTEROSCOPY, COMBINED N/A 11/15/2021    Procedure: HYSTEROSCOPY, WITH DILATION AND CURETTAGE OF UTERUS;  Surgeon: Ping Hutchins MD;  Location: [facility] OR      REMOVAL OF TONSILS,<14 Y/O      Description: Tonsillectomy;  Recorded: 2012;     LAPAROSCOPY       SHOULDER SURGERY        Allergies   Allergen Reactions     Penicillins Unknown     Annotation: unsure of reaction        Social History     Tobacco Use     Smoking status: Never Smoker     Smokeless tobacco: Never Used   Substance Use Topics     Alcohol use: Yes     Comment: 2-3 glasses of wine per day.      Wt Readings from Last 1 Encounters:   11/15/21 72.6 kg (160 lb)        Anesthesia Evaluation   Pt has had prior anesthetic.     No history of anesthetic complications       ROS/MED HX  ENT/Pulmonary:  - neg pulmonary ROS  (-) sleep apnea   Neurologic:  - neg neurologic ROS  (-) no seizures and no CVA   Cardiovascular: Comment:  TTE  1. Echo  2021 11:14:00 AM.     2. The left ventricular chamber dimension is  normal and systolic   function   is normal  with a 3D ejection fraction of 61%.     3. There is mildly increased left ventricular wall thickness.     4. The right ventricular chamber dimension is normal and systolic   function   is normal.     5. There is trivial aortic valve regurgitation.     6. There is trace mitral valve regurgitation.     7. There is trace tricuspid valve regurgitation.     8. There  is trace pulmonic regurgitation.     9. The proximal ascending aorta is mildly dilated measuring 4.00 cm with an index of 2.19 cm/m2.     10. No gross pericardial effusion.       (+) hypertension----- (-) CAD   METS/Exercise Tolerance:     Hematologic:  - neg hematologic  ROS     Musculoskeletal:  - neg musculoskeletal ROS     GI/Hepatic:  - neg GI/hepatic ROS     Renal/Genitourinary:  - neg Renal ROS     Endo:     (+) type II DM, Not using insulin,     Psychiatric/Substance Use:  - neg psychiatric ROS     Infectious Disease:  - neg infectious disease ROS  (-) Recent Fever   Malignancy:       Other:            Physical Exam    Airway  airway exam normal      Mallampati: II   TM distance: > 3 FB   Neck ROM: full   Mouth opening: > 3 cm    Respiratory Devices and Support         Dental  no notable dental history         Cardiovascular   cardiovascular exam normal       Rhythm and rate: regular and normal     Pulmonary   pulmonary exam normal        breath sounds clear to auscultation           OUTSIDE LABS:  CBC: No results found for: WBC, HGB, HCT, PLT  BMP:   Lab Results   Component Value Date     (A) 11/15/2021     COAGS: No results found for: PTT, INR, FIBR  POC: No results found for: BGM, HCG, HCGS  HEPATIC: No results found for: ALBUMIN, PROTTOTAL, ALT, AST, GGT, ALKPHOS, BILITOTAL, BILIDIRECT, TERESA  OTHER: No results found for: PH, LACT, A1C, ASH, PHOS, MAG, LIPASE, AMYLASE, TSH, T4, T3, CRP, SED    Anesthesia Plan    ASA Status:  2   NPO Status:  NPO Appropriate    Anesthesia Type: General.     - Airway: ETT   Induction: Intravenous, Propofol.   Maintenance: Balanced.        Consents    Anesthesia Plan(s) and associated risks, benefits, and realistic alternatives discussed. Questions answered and patient/representative(s) expressed understanding.    - Discussed:     - Discussed with:  Patient      - Extended Intubation/Ventilatory Support Discussed: No.      - Patient is DNR/DNI Status: No    Use of  blood products discussed: Yes.     - Discussed with: Patient.     - Consented: consented to blood products            Reason for refusal: other.     Postoperative Care    Pain management: Oral pain medications, Multi-modal analgesia, IV analgesics.   PONV prophylaxis: Ondansetron (or other 5HT-3), Dexamethasone or Solumedrol     Comments:    Other Comments: Scopolamine, Decadron, Zofran.  Diprivan infusion.  Toradol, Tylenol.  Ketamine (0.5 mg/kg).  Magnesium.                Miguel Angel Canela MD

## 2022-01-14 NOTE — INTERVAL H&P NOTE
I have reviewed the surgical (or preoperative) H&P that is linked to this encounter, and examined the patient. There are no significant changes/ We will proceed to OR for TLH, BSO, and cystoscopy. All questions answered and consents reviewed and signed.  Bethany Anderson, DO

## 2022-01-14 NOTE — DISCHARGE INSTRUCTIONS
Discharge Instructions: After Your Surgery  You ve just had surgery. During surgery, you were given medicine called anesthesia to keep you relaxed and free of pain. After surgery, you may have some pain or nausea. This is common. Here are some tips for feeling better and getting well after surgery.  Going home  Your healthcare provider will show you how to take care of yourself when you go home. He or she will also answer your questions. Have an adult family member or friend drive you home. For the first 24 hours after your surgery:    Don't drive or use heavy equipment.    Don't make important decisions or sign legal papers.    Don't drink alcohol.    Have someone stay with you. He or she can watch for problems and help keep you safe.  Be sure to go to all follow-up visits with your healthcare provider. And rest after your surgery for as long as your healthcare provider tells you to.  Coping with pain  If you have pain after surgery, pain medicine will help you feel better. Take it as told, before pain becomes severe. Also, ask your healthcare provider or pharmacist about other ways to control pain. This might be with heat, ice, or relaxation. And follow any other instructions your surgeon or nurse gives you.  Tips for taking pain medicine  To get the best relief possible, remember these points:    Pain medicines can upset your stomach. Taking them with a little food may help.    Most pain relievers taken by mouth need at least 20 to 30 minutes to start to work.    Don't wait till your pain becomes severe before you take your medicine. Try to time your medicine so that you can take it before starting an activity. This might be before you get dressed, go for a walk, or sit down for dinner.    Constipation is a common side effect of pain medicines. You may take laxatives or stool softeners to help ease constipation.  Drinking lots of fluids and eating foods such as fruits and vegetables that are high in fiber can  also help.     Drinking alcohol and taking pain medicine can cause dizziness and slow your breathing. It can even be deadly. Don't drink alcohol while taking pain medicine.    Pain medicine can make you react more slowly to things. Don't drive or run machinery while taking pain medicine.  Your healthcare provider may tell you to take acetaminophen to help ease your pain. Ask him or her how much you are supposed to take each day. Acetaminophen or other pain relievers may interact with your prescription medicines or other over-the-counter (OTC) medicines. Some prescription medicines have acetaminophen and other ingredients. Using both prescription and OTC acetaminophen for pain can cause you to overdose. Read the labels on your OTC medicines with care. This will help you to clearly know the list of ingredients, how much to take, and any warnings. It may also help you not take too much acetaminophen. If you have questions or don't understand the information, ask your pharmacist or healthcare provider to explain it to you before you take the OTC medicine.  Managing nausea  Some people have an upset stomach after surgery. This is often because of anesthesia, pain, or pain medicine, or the stress of surgery. These tips will help you handle nausea and eat healthy foods as you get better. If you were on a special food plan before surgery, ask your healthcare provider if you should follow it while you get better. These tips may help:    Don't push yourself to eat. Your body will tell you when to eat and how much.    Start off with clear liquids and soup. They are easier to digest.    Next try semi-solid foods, such as mashed potatoes, applesauce, and gelatin, as you feel ready.    Slowly move to solid foods. Don t eat fatty, rich, or spicy foods at first.    Don't force yourself to have 3 large meals a day. Instead eat smaller amounts more often.    Take pain medicines with a small amount of solid food, such as crackers or  toast, to prevent nausea.  When to call your healthcare provider  Call your healthcare provider if:    You still have intolerable pain an hour after taking medicine. The medicine may not be strong enough.    You feel too sleepy, dizzy, or groggy. The medicine may be too strong.    You have side effects such as nausea or vomiting, or skin changes such as rash, itching, or hives. Your healthcare provider may suggest other medicines to control side effects.  Rash, itching, or hives may mean you have an allergic reaction. Report this right away. If you have trouble breathing or facial swelling, call 911 right away.  If you have obstructive sleep apnea  You were given anesthesia medicine during surgery to keep you comfortable and free of pain. After surgery, you may have more apnea spells because of this medicine and other medicines you were given. The spells may last longer than usual.   At home:    Keep using the continuous positive airway pressure (CPAP) device when you sleep. Unless your healthcare provider tells you not to, use it when you sleep, day or night. CPAP is a common device used to treat obstructive sleep apnea.    Talk with your provider before taking any pain medicine, muscle relaxants, or sedatives. Your provider will tell you about the possible dangers of taking these medicines.  [employer] last reviewed this educational content on 3/1/2019    2492-3963 [employer]. All rights reserved. This information is not intended as a substitute for professional medical care. Always follow your healthcare professional's instructions.

## 2022-01-14 NOTE — OP NOTE
[facility] Brief Operative Note    Pre-operative diagnosis: Post-menopausal bleeding [N95.0]   Post-operative diagnosis same   Procedure: Procedure(s):  TOTAL LAPAROSCOPIC HYSTERECTOMY, BILATERAL SALPINGO-OPHORECTOMY,  CYSTOSCOPY   Bethany Anderson DO   Surgeon: Ping Hutchins MD   Assistants(s):    Estimated blood loss: 50cc   Specimens: Uterus and cervix, bilateral tubes and ovaries   Findings: Small normal appearing uterus, tubes normal, small atrophic ovaries     I assisted Dr Anderson with the above procedure.  Please see her full Op note for details.

## 2022-01-14 NOTE — OP NOTE
Total Laparoscopic Hysterectomy, Bilateral Salpingo-ophorectomy, Cystoscopy    Pre-operative Diagnosis: Pre-Op Diagnosis Codes:     * Post menopausal bleeding    Post-operative Diagnosis: Pre-Op Diagnosis Codes:     * Post menopausal bleeding    Surgeon: Bethany Anderson DO  Assistant: Ping Hutchins MD    Anesthesia: General with ET      Findings:  1. Normal appearing small mobile uterus with atrophic streak like ovaries bilaterally  2. Bilateral ureteral jets on cystoscopy  3. Hemostatic pedicles.       Procedure Details     After satisfactory administration of general anesthesia, the patient was placed in the dorsolithotomy position and prepped and draped in the usual sterile fashion. An examination had been performed with the findings as noted above.A timeout was taken to ensure the correct patient and correct procedure. A lloyd catheter was placed into the bladder and a speculum placed in the vagina to visualize the cervix. The cervix was grasped with a single tooth tenaculum and a uterine manipulator placed. The speculum was removed and the perineum was draped off from the abdominal field.  At this time, attention was turned to the abdomen. A vertical skin incision was made through the umbilicus with the scalpel, a Veress needle was inserted into the abdominal cavity. Correct placement was assured by low opening pressure. The abdomen was insufflated with carbon dioxide. After adequate insufflation, Veress needle was removed and an 5mm trocar was introduced through the umbilical incision into the abdominal cavity. Through the trocar sheath, the laparoscope was inserted and adequate visualization of the pelvic structures was noted. Pelvic findings were noted as above.   At this time, the patient was placed in the Trendelenburg position and a 10-mm skin incision was made in the right lower quadrant with attention made to avoid the inferior epigastric artery. A 10-mm trocar was introduced into the abdominal  cavity for instrumentation. This was repeated in the right middle quadrant and left lower quadrant. Pelvic survey and pelvic findings noted as above.   The bilateral ureters were visualized. The right cornua was grasped and the right fallopian tube and IP ligaments were coagulated and cut, then the round ligaments were coagulated with bipolar electrocautery and transected using the Ligasure device. A similar procedure was carried out on the left with the left uterine cornua identified. The left fallopian tube and IP,round ligaments were doubly coagulated with bipolar electrocautery and transected. Attention was returned to the right side where the remainder of the uterine vessels and anterior and posterior leaves of the broad ligament, as well as the cardinal ligament was coagulated and transected in a serial fashion down to level of the uterine artery. The uterine artery was identified. It was doubly coagulated with bipolar electrocautery and transected.   The remainder of the cardinal ligament, uterine vessels, anterior, and posterior sheaths of the broad ligament were coagulated and transected on the left side. in a serial manner to the level of the uterine artery. The uterine artery was identified. It was doubly coagulated with bipolar electrocautery and transected. The anterior leaf of the broad ligament was then dissected to the midline bilaterally, establishing a bladder flap with sharp dissection.   At this time the colpotomy was made using the ligasure hook device. Care was taken to perform the colpotomy at the level of the Vcare cup. This was performed in a clockwise fashion on the uterus was detached and removed from the vagina.   Attention was then turned to the abdomen. The abdomen was re-insufflated and the vaginal cuff was closed laparoscopically using V-lock suture. Hemostasis was noted throughout. The abdomen was desufflated and all pedicles appeared hemostatic. The laparoscopic ports were closed  using 4-0 vicryl.   A cystoscope was performed at the end of the procedure and bilateral ureteral jets were noted. The instrument, sponge and needle count were correct x 2.   The patient was awoken from General anesthesia without difficulty and transported to the recovery room in good condition.       Estimated Blood Loss:  50cc                    Specimens: uterus, cervix, bilateral fallopian tubes and ovaries                    Complications:  None; patient tolerated the procedure well.               Bethany Anderson, DO

## 2022-01-17 LAB
PATH REPORT.COMMENTS IMP SPEC: NORMAL
PATH REPORT.COMMENTS IMP SPEC: NORMAL
PATH REPORT.FINAL DX SPEC: NORMAL
PATH REPORT.GROSS SPEC: NORMAL
PATH REPORT.MICROSCOPIC SPEC OTHER STN: NORMAL
PATH REPORT.RELEVANT HX SPEC: NORMAL
PHOTO IMAGE: NORMAL

## 2022-01-17 PROCEDURE — 88307 TISSUE EXAM BY PATHOLOGIST: CPT | Mod: 26 | Performed by: PATHOLOGY

## 2022-01-24 ENCOUNTER — DOCUMENTATION ONLY (OUTPATIENT)
Dept: OTHER | Facility: CLINIC | Age: 57
End: 2022-01-24

## 2022-09-25 ENCOUNTER — HEALTH MAINTENANCE LETTER (OUTPATIENT)
Age: 57
End: 2022-09-25

## 2022-12-01 ENCOUNTER — ANCILLARY PROCEDURE (OUTPATIENT)
Dept: MAMMOGRAPHY | Facility: CLINIC | Age: 57
End: 2022-12-01
Attending: INTERNAL MEDICINE
Payer: COMMERCIAL

## 2022-12-01 DIAGNOSIS — Z12.31 VISIT FOR SCREENING MAMMOGRAM: ICD-10-CM

## 2022-12-01 PROCEDURE — 77067 SCR MAMMO BI INCL CAD: CPT

## 2023-01-30 ENCOUNTER — HEALTH MAINTENANCE LETTER (OUTPATIENT)
Age: 58
End: 2023-01-30

## 2023-08-26 NOTE — INTERVAL H&P NOTE
I have reviewed the surgical (or preoperative) H&P that is linked to this encounter, and examined the patient. There are no significant changes  All set for hysteroscopy, D&C.   Use the 5 A's (Ask, Advise, Assess, Assist, Arrange)

## 2024-01-10 ENCOUNTER — ANCILLARY PROCEDURE (OUTPATIENT)
Dept: MAMMOGRAPHY | Facility: CLINIC | Age: 59
End: 2024-01-10
Attending: INTERNAL MEDICINE
Payer: COMMERCIAL

## 2024-01-10 DIAGNOSIS — Z12.31 VISIT FOR SCREENING MAMMOGRAM: ICD-10-CM

## 2024-01-10 PROCEDURE — 77063 BREAST TOMOSYNTHESIS BI: CPT

## 2024-01-17 ENCOUNTER — ANCILLARY PROCEDURE (OUTPATIENT)
Dept: MAMMOGRAPHY | Facility: CLINIC | Age: 59
End: 2024-01-17
Attending: NURSE PRACTITIONER
Payer: COMMERCIAL

## 2024-01-17 DIAGNOSIS — N64.89 BREAST ASYMMETRY: ICD-10-CM

## 2024-01-17 PROCEDURE — 77061 BREAST TOMOSYNTHESIS UNI: CPT | Mod: LT

## 2024-03-02 ENCOUNTER — HEALTH MAINTENANCE LETTER (OUTPATIENT)
Age: 59
End: 2024-03-02

## 2024-09-23 ENCOUNTER — MEDICAL CORRESPONDENCE (OUTPATIENT)
Dept: HEALTH INFORMATION MANAGEMENT | Facility: CLINIC | Age: 59
End: 2024-09-23
Payer: COMMERCIAL

## 2024-09-25 ENCOUNTER — TRANSCRIBE ORDERS (OUTPATIENT)
Dept: OTHER | Age: 59
End: 2024-09-25

## 2024-09-25 ENCOUNTER — REFERRAL (OUTPATIENT)
Dept: TRANSPLANT | Facility: CLINIC | Age: 59
End: 2024-09-25

## 2024-09-25 DIAGNOSIS — C22.0 HEPATOCELLULAR CARCINOMA (H): ICD-10-CM

## 2024-09-25 DIAGNOSIS — C22.0 HCC (HEPATOCELLULAR CARCINOMA) (H): ICD-10-CM

## 2024-09-25 DIAGNOSIS — K75.4 AUTOIMMUNE HEPATITIS (H): Primary | ICD-10-CM

## 2024-09-25 DIAGNOSIS — Z76.82 AWAITING ORGAN TRANSPLANT: Primary | ICD-10-CM

## 2024-09-25 DIAGNOSIS — K74.60 CIRRHOSIS OF LIVER WITHOUT ASCITES, UNSPECIFIED HEPATIC CIRRHOSIS TYPE (H): ICD-10-CM

## 2024-09-25 DIAGNOSIS — K76.6 PORTAL HYPERTENSION (H): ICD-10-CM

## 2024-09-25 DIAGNOSIS — K74.60 HEPATIC CIRRHOSIS, UNSPECIFIED HEPATIC CIRRHOSIS TYPE, UNSPECIFIED WHETHER ASCITES PRESENT (H): ICD-10-CM

## 2024-09-25 NOTE — LETTER
Ava Miranda   [address]  [facility] [address]        Dear Ava,    Thank you for your interest in the Transplant Center at [facility]. We look forward to being a part of your care team and assisting you through the transplant process.    As we discussed, your transplant coordinator is Miguel Angel Gee Jr. (Liver).  You may call your coordinator at any time with questions or concerns.  Your first scheduled call will be on 10/02/2024 between 1-2pm.  If this needs to change, call [phone].    Please complete the following.    Fill out and return the enclosed forms  Authorization for Electronic Communication  Authorization to Discuss Protected Health Information  Authorization for Release of Protected Health Information    Sign up for:  [employer], access to your electronic medical record (see enclosed pamphlet)  [employer], a transplant education website                                                                    My Transplant Place     You can use these tools to learn more about your transplant, communicate with your care team, and track your medical details      Sincerely,      Solid Organ Transplant  [facility]     cc: Referring Physician PCP

## 2024-09-30 VITALS — BODY MASS INDEX: 25.61 KG/M2 | HEIGHT: 64 IN | WEIGHT: 150 LBS

## 2024-09-30 PROBLEM — K74.60 CIRRHOSIS OF LIVER WITHOUT ASCITES (H): Status: ACTIVE | Noted: 2023-01-10

## 2024-09-30 PROBLEM — I85.00 ESOPHAGEAL VARICES WITHOUT BLEEDING (H): Status: ACTIVE | Noted: 2024-07-23

## 2024-09-30 PROBLEM — C22.0 HEPATOCELLULAR CARCINOMA (H): Status: ACTIVE | Noted: 2024-07-23

## 2024-09-30 PROBLEM — E11.9 TYPE 2 DIABETES MELLITUS WITHOUT COMPLICATION, WITHOUT LONG-TERM CURRENT USE OF INSULIN (H): Status: ACTIVE | Noted: 2024-07-23

## 2024-09-30 NOTE — TELEPHONE ENCOUNTER
"  September 30, 2024 11:25 AM - Nichol Martin LPN:   Patient was asked the following questions during liver intake call.     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 New Sx to pt.   4) History of GI Bleeding: Yes 9/12/2023, banding x3 with another pending   5) Oxygen Use: No  6) EGD:  9/12/2024  7) Colonoscopy: none on file   8) MELD Score: 11 (7/17/2024)  9) Labs available for review from PCP/GI:  [facility]    10)HCC Diagnosis: HCC (if no, go to #11)          Abdominal CT: None found           MRI:  9/16/2024          Bone Scan: [facility] 5/04/2016 \"normal\"          PET: None           Chest CT: None found           Treatment Notes w/ Images: [facility]                                Referral intake process completed.  Patient is aware that after financial approval is received, medical records will be requested.   Patient confirmed for a callback from transplant coordinator on 10/02/2024.  Tentative evaluation date TBD.     Confirmed coordinator will discuss evaluation process in more detail at the time of their call.   Patient is aware of the need to arrange age appropriate cancer screening, vaccinations, and dental care.  Reminded patient to complete questionnaire, complete medical records release, " and review packet prior to evaluation visit .  Assessed patient for special needs (ie--wheelchair, assistance, guardian, and ):   None   Patient instructed to call [phone] with questions.      Patient gave verbal consent during intake call to obtain medical records and documents outside of [facility]:   Yes  Patient agrees to docusign: Yes

## 2024-10-02 NOTE — TELEPHONE ENCOUNTER
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  Stays in [place], [place] when in [facility]    First found out October 2022, went in for DM / wellness check due to lightheadedness / syncope / fatigue that was not normal for her    Patient was drinking socially and stopped all alcohol October 2022.  Last bx in [facility] on 6/14/24    HCC treatment 8/12/24    Ascites - no  Taps - no  HE - possible but more causing fatigue  GIB - no  EGD - last 9/12/24 in [facility], gill'd 10/24/24  Colon -     Heart - no  Head - no  Lungs - never smoker  Kidneys - no  Cancer - just HCC    DM - x 5 years on oral  Patient had early shailesh-pause (late teens/early 20s)    Sx: s/p full hysterectomy     Plan: 10/29 slot D with Dr. Brown and PLE team to discuss LRD options

## 2024-10-11 ENCOUNTER — DOCUMENTATION ONLY (OUTPATIENT)
Dept: TRANSPLANT | Facility: CLINIC | Age: 59
End: 2024-10-11
Payer: COMMERCIAL

## 2024-10-11 NOTE — Clinical Note
Tumor Board Review:  Recommendations:     [facility] for transplant eligiblity. Need to review with pre-treatment images which were not available at conference to get the full story of her imaging.  This interpretation can be for post-treatment changes.    Follow-up on Skin Biopsies in [facility] from 9/30/24

## 2024-10-11 NOTE — CONFIDENTIAL NOTE
Tumor Board Review:       10/11/2024      Imaging Discussed:     09/18/2024 post treatment MR      Interpretation:    Seg 4 Arterial Enhancement, post treatment changes noted, compared to noé enhancing feature from 2023, no new lesions or concerning lesions noted      Recommendations:     [facility] for transplant eligiblity. Need to review with pre-treatment images which were not available at conference to get the full story of her imaging.  This interpretation can be for post-treatment changes.

## 2024-10-18 ENCOUNTER — DOCUMENTATION ONLY (OUTPATIENT)
Dept: TRANSPLANT | Facility: CLINIC | Age: 59
End: 2024-10-18
Payer: COMMERCIAL

## 2024-10-18 NOTE — PROGRESS NOTES
5/29/24 OSH MRI  1.) 2.2 cm w/ enchancement but w/o washout or capsule LR-4    9/16/24 OSH  1.) treatment changes, but need more time to appreciate full treatment response    Recs:  - 3 mo follow up imaging

## 2024-10-28 LAB
ABO/RH(D): NORMAL
ANTIBODY SCREEN: NEGATIVE
SPECIMEN EXPIRATION DATE: NORMAL

## 2024-10-28 PROCEDURE — 99358 PROLONG SERVICE W/O CONTACT: CPT | Performed by: INTERNAL MEDICINE

## 2024-10-28 NOTE — PROGRESS NOTES
[facility] SOLID ORGAN TRANSPLANT  OUTPATIENT MNT: LIVER TRANSPLANT EVALUATION    Current BMI: 26.8 (HT 64 in,  lbs/71 kg)  BMI is within recommendation of <45 for liver transplant    Handgrip Strength (average of 3 using dominant hand): 18    FraiLT-- Pre frail (LFI: 3.25)  [email or web address]  Reference Range  Robust <3.2  Pre Frail 3.2-4.3  Frail >4.4     TIME SPENT: 30 minutes  VISIT TYPE: Initial   REFERRING PHYSICIAN: Chuy  PT ACCOMPANIED BY: her  and daughter     History of previous txp: none     NUTRITION ASSESSMENT  H/o DM II, autoimmune cirrhosis, HCC  Used to check BG every morning, now every few weeks - reports getting busier, appts check them / prev fasting #s , now 130-145   Only on metformin, prednisone since January   Last A1c 5.5 (June 2024)    Vitamins, Supplements, Pertinent Meds: MVI, fiber gummy, hair/skin/nail   Herbal Medicines/Supplements: none   Protein supplements: used to use more whey protein mixed with banana, PB2, occasional [employer] milk, yogurt - consumes maybe 1x/week; now does daily Premier Protein (30 g) x 3 years      Weight hx // fluid retention:   - no JAYLA  - lost weight (170-->142)-->back up in the 150s; reports weight fluctuations are typical for her when she goes in phases of managing DM/diet better, etc     PHYSICAL ACTIVITY   No routine activity; no physical limitations   Was walking more routinely ~1 yr or more ago     DIET RECALL  Breakfast Protein shake    Lunch Banana or apple    Dinner Steak/fish/salmon + potato (not nightly) + veggies/salad    Snacks HS- some ice cream, oreos, mini rice cakes, candy bars (mini)   Beverages Coffee, water, occasional diet soda, Sparkling Ice     Dining out 50% of meals for dinner- pizza, steak house, bar food (chicken wings, broasted chicken)     NUTRITION DIAGNOSIS   No nutrition diagnosis identified at this time     NUTRITION INTERVENTION   Nutrition education provided:  Discussed  sodium intake (low sodium foods and drinks, seasoning food without salt and tips for low sodium diet).    Reviewed adequate protein intake. Encouraged receiving protein from both animal and plant based sources.     Reviewed post txp diet guidelines in brief (will review in further detail post txp):  (1) Review of proper food safety measures d/t immunosuppressant therapy post-op and increased risk for food-borne illness    (2) Avoid the following post txp d/t risk for rejection, unknown effects on the organs, and/or potential interactions with immunosuppressants:  - Herbal, Chinese, holistic, chiropractic, natural, alternative medicines and supplements  - Detoxes and cleanses  - Weight loss pills  - Protein powders or other products with extracts or herbs (ie green tea extract)    (3) Med regimen and possible side effects    Patient Understanding: Pt verbalized understanding of education provided.  Expected Engagement: Good  Follow-Up Plans: PRN     NUTRITION GOALS   No nutrition goals identified at this time    Inna Sherwood, RD, LD, CCTD

## 2024-10-29 ENCOUNTER — ANCILLARY PROCEDURE (OUTPATIENT)
Dept: ULTRASOUND IMAGING | Facility: CLINIC | Age: 59
End: 2024-10-29
Attending: INTERNAL MEDICINE
Payer: COMMERCIAL

## 2024-10-29 ENCOUNTER — COMMITTEE REVIEW (OUTPATIENT)
Dept: TRANSPLANT | Facility: CLINIC | Age: 59
End: 2024-10-29

## 2024-10-29 ENCOUNTER — ALLIED HEALTH/NURSE VISIT (OUTPATIENT)
Dept: TRANSPLANT | Facility: CLINIC | Age: 59
End: 2024-10-29
Attending: INTERNAL MEDICINE
Payer: COMMERCIAL

## 2024-10-29 ENCOUNTER — ANCILLARY PROCEDURE (OUTPATIENT)
Dept: GENERAL RADIOLOGY | Facility: CLINIC | Age: 59
End: 2024-10-29
Attending: INTERNAL MEDICINE
Payer: COMMERCIAL

## 2024-10-29 ENCOUNTER — LAB (OUTPATIENT)
Dept: LAB | Facility: CLINIC | Age: 59
End: 2024-10-29
Attending: INTERNAL MEDICINE
Payer: COMMERCIAL

## 2024-10-29 VITALS
DIASTOLIC BLOOD PRESSURE: 77 MMHG | WEIGHT: 157.3 LBS | HEART RATE: 68 BPM | SYSTOLIC BLOOD PRESSURE: 128 MMHG | TEMPERATURE: 97.8 F | OXYGEN SATURATION: 97 % | HEIGHT: 64 IN | BODY MASS INDEX: 26.85 KG/M2 | RESPIRATION RATE: 18 BRPM

## 2024-10-29 VITALS
BODY MASS INDEX: 26.85 KG/M2 | HEIGHT: 64 IN | OXYGEN SATURATION: 98 % | SYSTOLIC BLOOD PRESSURE: 128 MMHG | HEART RATE: 63 BPM | RESPIRATION RATE: 16 BRPM | TEMPERATURE: 97.8 F | DIASTOLIC BLOOD PRESSURE: 77 MMHG | WEIGHT: 157.3 LBS

## 2024-10-29 DIAGNOSIS — C22.0 HEPATOCELLULAR CARCINOMA (H): ICD-10-CM

## 2024-10-29 DIAGNOSIS — K74.60 CIRRHOSIS OF LIVER WITHOUT ASCITES, UNSPECIFIED HEPATIC CIRRHOSIS TYPE (H): ICD-10-CM

## 2024-10-29 DIAGNOSIS — K76.6 PORTAL HYPERTENSION (H): ICD-10-CM

## 2024-10-29 DIAGNOSIS — K75.4 AUTOIMMUNE HEPATITIS (H): ICD-10-CM

## 2024-10-29 DIAGNOSIS — K74.60 CIRRHOSIS OF LIVER WITHOUT ASCITES (H): Primary | ICD-10-CM

## 2024-10-29 DIAGNOSIS — Z76.82 AWAITING ORGAN TRANSPLANT: ICD-10-CM

## 2024-10-29 DIAGNOSIS — K75.4 AUTOIMMUNE HEPATITIS (H): Primary | ICD-10-CM

## 2024-10-29 DIAGNOSIS — Z76.82 AWAITING ORGAN TRANSPLANT: Primary | ICD-10-CM

## 2024-10-29 DIAGNOSIS — K74.60 CIRRHOSIS OF LIVER WITHOUT ASCITES, UNSPECIFIED HEPATIC CIRRHOSIS TYPE (H): Primary | ICD-10-CM

## 2024-10-29 LAB
AFP SERPL-MCNC: 16.5 NG/ML
ALBUMIN MFR UR ELPH: 19.9 MG/DL
ALBUMIN SERPL BCG-MCNC: 3.9 G/DL (ref 3.5–5.2)
ALBUMIN UR-MCNC: 20 MG/DL
ALP SERPL-CCNC: 115 U/L (ref 40–150)
ALT SERPL W P-5'-P-CCNC: 47 U/L (ref 0–50)
ANION GAP SERPL CALCULATED.3IONS-SCNC: 9 MMOL/L (ref 7–15)
ANTIBODY TITER IGM SCREEN: NEGATIVE
APPEARANCE UR: CLEAR
AST SERPL W P-5'-P-CCNC: 49 U/L (ref 0–45)
B IGG TITR SERPL: 16 {TITER}
B IGM TITR SERPL: 16 {TITER}
BILIRUB DIRECT SERPL-MCNC: 0.48 MG/DL (ref 0–0.3)
BILIRUB SERPL-MCNC: 1.3 MG/DL
BILIRUB UR QL STRIP: NEGATIVE
BUN SERPL-MCNC: 10.9 MG/DL (ref 6–20)
CALCIUM SERPL-MCNC: 9.2 MG/DL (ref 8.8–10.4)
CHLORIDE SERPL-SCNC: 104 MMOL/L (ref 98–107)
CMV IGG SERPL IA-ACNC: 0.21 U/ML
CMV IGG SERPL IA-ACNC: NORMAL
COLOR UR AUTO: YELLOW
CREAT SERPL-MCNC: 0.59 MG/DL (ref 0.51–0.95)
CREAT UR-MCNC: 223 MG/DL
EBV VCA IGG SER IA-ACNC: >750 U/ML
EBV VCA IGG SER IA-ACNC: POSITIVE
EGFRCR SERPLBLD CKD-EPI 2021: >90 ML/MIN/1.73M2
ERYTHROCYTE [DISTWIDTH] IN BLOOD BY AUTOMATED COUNT: 15.2 % (ref 10–15)
GLUCOSE SERPL-MCNC: 179 MG/DL (ref 70–99)
GLUCOSE UR STRIP-MCNC: NEGATIVE MG/DL
HAV AB SER QL IA: NONREACTIVE
HCG SERPL QL: NEGATIVE
HCO3 SERPL-SCNC: 27 MMOL/L (ref 22–29)
HCT VFR BLD AUTO: 36.9 % (ref 35–47)
HGB BLD-MCNC: 13 G/DL (ref 11.7–15.7)
HGB UR QL STRIP: NEGATIVE
INR PPP: 1.27 (ref 0.85–1.15)
IRON BINDING CAPACITY (ROCHE): 227 UG/DL (ref 240–430)
IRON SATN MFR SERPL: 47 % (ref 15–46)
IRON SERPL-MCNC: 107 UG/DL (ref 37–145)
KETONES UR STRIP-MCNC: 10 MG/DL
LEUKOCYTE ESTERASE UR QL STRIP: ABNORMAL
MCH RBC QN AUTO: 37.9 PG (ref 26.5–33)
MCHC RBC AUTO-ENTMCNC: 35.2 G/DL (ref 31.5–36.5)
MCV RBC AUTO: 108 FL (ref 78–100)
MUCOUS THREADS #/AREA URNS LPF: PRESENT /LPF
NITRATE UR QL: NEGATIVE
PH UR STRIP: 7 [PH] (ref 5–7)
PHOSPHATE SERPL-MCNC: 3.6 MG/DL (ref 2.5–4.5)
PLATELET # BLD AUTO: 94 10E3/UL (ref 150–450)
POTASSIUM SERPL-SCNC: 3.8 MMOL/L (ref 3.4–5.3)
PROT SERPL-MCNC: 7.6 G/DL (ref 6.4–8.3)
PROT/CREAT 24H UR: 0.09 MG/MG CR (ref 0–0.2)
RBC # BLD AUTO: 3.43 10E6/UL (ref 3.8–5.2)
RBC URINE: 3 /HPF
SODIUM SERPL-SCNC: 140 MMOL/L (ref 135–145)
SP GR UR STRIP: 1.03 (ref 1–1.03)
SPECIMEN EXPIRATION DATE: NORMAL
SQUAMOUS EPITHELIAL: 8 /HPF
T PALLIDUM AB SER QL: NONREACTIVE
T4 FREE SERPL-MCNC: 1.05 NG/DL (ref 0.9–1.7)
TSH SERPL DL<=0.005 MIU/L-ACNC: 4.31 UIU/ML (ref 0.3–4.2)
UROBILINOGEN UR STRIP-MCNC: 8 MG/DL
VIT D+METAB SERPL-MCNC: 42 NG/ML (ref 20–50)
WBC # BLD AUTO: 4.3 10E3/UL (ref 4–11)
WBC URINE: 6 /HPF

## 2024-10-29 PROCEDURE — 86481 TB AG RESPONSE T-CELL SUSP: CPT | Performed by: INTERNAL MEDICINE

## 2024-10-29 PROCEDURE — 83550 IRON BINDING TEST: CPT | Performed by: PATHOLOGY

## 2024-10-29 PROCEDURE — 86780 TREPONEMA PALLIDUM: CPT | Performed by: INTERNAL MEDICINE

## 2024-10-29 PROCEDURE — G0480 DRUG TEST DEF 1-7 CLASSES: HCPCS | Mod: 90 | Performed by: PATHOLOGY

## 2024-10-29 PROCEDURE — 84443 ASSAY THYROID STIM HORMONE: CPT | Performed by: PATHOLOGY

## 2024-10-29 PROCEDURE — 87086 URINE CULTURE/COLONY COUNT: CPT | Performed by: INTERNAL MEDICINE

## 2024-10-29 PROCEDURE — 85027 COMPLETE CBC AUTOMATED: CPT | Performed by: PATHOLOGY

## 2024-10-29 PROCEDURE — 84703 CHORIONIC GONADOTROPIN ASSAY: CPT | Performed by: PATHOLOGY

## 2024-10-29 PROCEDURE — 99000 SPECIMEN HANDLING OFFICE-LAB: CPT | Performed by: PATHOLOGY

## 2024-10-29 PROCEDURE — 82105 ALPHA-FETOPROTEIN SERUM: CPT | Performed by: INTERNAL MEDICINE

## 2024-10-29 PROCEDURE — 84439 ASSAY OF FREE THYROXINE: CPT | Performed by: PATHOLOGY

## 2024-10-29 PROCEDURE — 99213 OFFICE O/P EST LOW 20 MIN: CPT | Performed by: TRANSPLANT SURGERY

## 2024-10-29 PROCEDURE — 86665 EPSTEIN-BARR CAPSID VCA: CPT | Performed by: INTERNAL MEDICINE

## 2024-10-29 PROCEDURE — 99204 OFFICE O/P NEW MOD 45 MIN: CPT | Performed by: TRANSPLANT SURGERY

## 2024-10-29 PROCEDURE — 85610 PROTHROMBIN TIME: CPT | Performed by: PATHOLOGY

## 2024-10-29 PROCEDURE — 86886 COOMBS TEST INDIRECT TITER: CPT

## 2024-10-29 PROCEDURE — 99213 OFFICE O/P EST LOW 20 MIN: CPT | Performed by: INTERNAL MEDICINE

## 2024-10-29 PROCEDURE — 36415 COLL VENOUS BLD VENIPUNCTURE: CPT | Performed by: PATHOLOGY

## 2024-10-29 PROCEDURE — 86900 BLOOD TYPING SEROLOGIC ABO: CPT

## 2024-10-29 PROCEDURE — 86708 HEPATITIS A ANTIBODY: CPT | Performed by: INTERNAL MEDICINE

## 2024-10-29 PROCEDURE — 80053 COMPREHEN METABOLIC PANEL: CPT | Performed by: PATHOLOGY

## 2024-10-29 PROCEDURE — 80307 DRUG TEST PRSMV CHEM ANLYZR: CPT | Mod: 90 | Performed by: PATHOLOGY

## 2024-10-29 PROCEDURE — 93975 VASCULAR STUDY: CPT | Mod: GC | Performed by: RADIOLOGY

## 2024-10-29 PROCEDURE — 83540 ASSAY OF IRON: CPT | Performed by: PATHOLOGY

## 2024-10-29 PROCEDURE — 82248 BILIRUBIN DIRECT: CPT | Performed by: PATHOLOGY

## 2024-10-29 PROCEDURE — 82306 VITAMIN D 25 HYDROXY: CPT | Performed by: INTERNAL MEDICINE

## 2024-10-29 PROCEDURE — 76700 US EXAM ABDOM COMPLETE: CPT | Mod: XU | Performed by: RADIOLOGY

## 2024-10-29 PROCEDURE — 71046 X-RAY EXAM CHEST 2 VIEWS: CPT | Mod: GC | Performed by: RADIOLOGY

## 2024-10-29 PROCEDURE — 86644 CMV ANTIBODY: CPT | Performed by: INTERNAL MEDICINE

## 2024-10-29 PROCEDURE — 84100 ASSAY OF PHOSPHORUS: CPT | Performed by: PATHOLOGY

## 2024-10-29 PROCEDURE — 99205 OFFICE O/P NEW HI 60 MIN: CPT | Performed by: INTERNAL MEDICINE

## 2024-10-29 RX ORDER — OMEPRAZOLE 40 MG/1
40 CAPSULE, DELAYED RELEASE ORAL DAILY
COMMUNITY

## 2024-10-29 RX ORDER — MULTIPLE VITAMINS W/ MINERALS TAB 9MG-400MCG
1 TAB ORAL DAILY
COMMUNITY

## 2024-10-29 RX ORDER — CARVEDILOL 3.12 MG/1
3.12 TABLET ORAL 2 TIMES DAILY WITH MEALS
COMMUNITY
Start: 2024-09-23 | End: 2025-09-23

## 2024-10-29 RX ORDER — PREDNISONE 2.5 MG/1
2.5 TABLET ORAL DAILY
COMMUNITY

## 2024-10-29 RX ORDER — AZATHIOPRINE 50 MG/1
50 TABLET ORAL DAILY
COMMUNITY
Start: 2024-10-22

## 2024-10-29 NOTE — NURSING NOTE
"Chief Complaint   Patient presents with    Transplant Evaluation     Liver transplant evaluation     Vital signs:  Temp: 97.8  F (36.6  C) Temp src: Oral BP: 128/77 Pulse: 63   Resp: 16 SpO2: 98 %     Height: 163 cm (5' 4.17\") Weight: 71.4 kg (157 lb 4.8 oz)  Estimated body mass index is 26.86 kg/m  as calculated from the following:    Height as of this encounter: 1.63 m (5' 4.17\").    Weight as of this encounter: 71.4 kg (157 lb 4.8 oz).      Carrillo Hammonds RN on 10/29/2024 at 9:09 AM    "

## 2024-10-29 NOTE — NURSING NOTE
"Chief Complaint   Patient presents with    Transplant Evaluation     Liver transplant evaluation     Vital signs:  Temp: 97.8  F (36.6  C) Temp src: Oral BP: 128/77 Pulse: 68   Resp: 18 SpO2: 97 %     Height: 163 cm (5' 4.17\") Weight: 71.4 kg (157 lb 4.8 oz)  Estimated body mass index is 26.86 kg/m  as calculated from the following:    Height as of this encounter: 1.63 m (5' 4.17\").    Weight as of this encounter: 71.4 kg (157 lb 4.8 oz).      Carrillo Hammonds RN on 10/29/2024 at 9:08 AM    "

## 2024-10-29 NOTE — PROGRESS NOTES
Psychosocial Assessment    Ava Miranda was seen in the Transplant Center as part of her evaluation as a potential liver transplant recipient .  She presented with her Christopher (spouse) and Brenna (daughter) today in clinic.     Living Situation: Ava is a 59 year old female who lives in a single family home in  [place], [place]. Ava resides with her spouse Christopher. She does not report any concerns related to their living environment.     [place], [place] is about 225 miles away from our transplant center. We discussed requirement to remain within 50 miles of our transplant center post discharge from the hospital for about a month with a caregiver. We reviewed lodging and funding options. They voiced understandings towards remaining local. They have an adequate plan to remain local post transplantation. Their two children live in [place], [place]. They also voiced they have the financial resources to private pay for a play to stay near the [facility].    Education/Employment:  Ava graduated with a bachelors degree. She currently works for [employer]. They've worked at this company for 13 years. . Ava is not a .     Financial /Income: Ava's primary source of income is through her employment. Her spouse receives care home. She reports being financially stable.     Health Insurance: She has out of state BCBS through her employer.     This writer talked with Ava about the financial risks of transplant, particularly about the high cost of transplant related medications and the importance of maintaining adequate health insurance coverage.    Family/Social Support: Ava is  and has two children, Oneil ([place], [place]), and Brenna ([place], [place]). She has one brother, Akash who has mental health concerns. She reports that she does not speak with her brother. Her father (Kash) and stepmother live in [place], and her mother (Munira) and stepfather live in [place].    This writer stressed the importance of having a stable and  involved support network before and after transplant.  Provided Ava with education about the relationship between a stable support system and better surgical and post-transplant outcomes compared to patients with a limited support system.      Functional Status: Ava is independent with all ADL/IADLs. She manages her own medications. She continues to drive.     Chemical Dependency:  Ava's last consumption of alcohol was October 2022, when advised to discontinue use. Prior to that she reported that would drink 1-3 drinks per day during the week, and often consume more during the weekend. She reported that her consumption of alcohol was social. No history of tobacco use, misuse/over use of pain medications, or illicit substances.     No history of treatment or legal consequences related to substances.     Mental Health: Ava denies any history of being diagnosed with a mental health disorder. she has no history of seeing a therapist or being prescribed medication for her mental health.  Ava denies any history of hospitalization for mental health treatment.       Adjustment to Illness:  This writer provided Ava with supportive counseling throughout this interview.  This writer also encouraged Ava to attend the liver transplant support group for additional support and encouragement.     Impression/Recommendations:   Ava, her spouse and daughter verbalizes understanding the psychosocial risks of transplant and teaching provided during this evaluation.    Ava last consumption of alcohol was October 2022. Ava meets recommended sobriety criteria for listing. No further recommendations at this time as she has been able to maintain sobriety for two + years. Peth and EtG from today are pending.     Ava's primary caregiver will be her spouse, Christopher. This writer confirmed with Christopher their ability and willingness to provide post transplant caregiver support.     Ava has  adequate finances and health insurance for transplant, and an intact support system. This writer will remain available to assist patient throughout the evaluation process and will follow patient through transplant if she is listed.  It was a pleasure to evaluate this patient for liver transplant.    Ava is a acceptable transplant candidate from a psychosocial perspective.      Teaching completed during assessment:  1.     Housing and relocation needs post transplant.  2.     Caregiver needs post transplant.  3.     Financial issues related to transplant.  4.     Risks of alcohol use post transplant.  5.     Common psychosocial stressors pre/post transplant.        6.     Liver Transplant support group availability.        7.     [facility] [employer] Directive - Status of health care directive: On file             Psychosocial Risks of Transplant Reviewed:  1.     Increased stress related to your emotional, family, social, employment, or   financial situation.  2.     Affect on work and/or disability benefits.  3.     Affect on future health and life insurance.  4.     Transplant outcome expectations may not be met.  5.     Mental Health risks: anxiety, depression, PTSD, guilt, grief and chronic fatigue.     MADONNA Helton

## 2024-10-29 NOTE — COMMITTEE REVIEW
Liver Committee Review Note     Evaluation Date: 10/29/2024  Committee Review Date: 10/29/2024    Organ being evaluated for: Liver    Transplant Phase: Evaluation  Transplant Status: Active    Transplant Coordinator: Miguel Angel Gee Jr.  Transplant Surgeon:   Elton Vera    Referring Physician: Trena Milligan    Primary Diagnosis: Primary Liver Malignancy: Hepatoma (HCC) and Cirrhosis  Secondary Diagnosis: Cirrhosis: Autoimmune    Transplant Eligibility: Cirrhosis with MELD, Autoimmune Hepatitis    Committee Review Decision: Needs Re-presentation    Relative Contraindications: Other, see below    Absolute Contraindications: None    Live Donor: TBD    Committee Chair Melvin Sargent MD verbally attested to the committee's decision.    Committee Discussion Details: re-discuss    Committee Review Members:  Nutrition Inna Sherwood, RD   Pharmacist Jessica Burciaga, [facility]    - Clinical CHARANJIT Perez, Krystle Mancini, [facility]   Transplant Vandana Hansen LPN, Layne Pabon, RN, Humera Borja, RN, Maria Luisa Olivera, RN, Jr Miguel Angel Gee, ILIR, January Woodward, APRN CNP, Chelsy Hunter, RN, Nahum Robison, RN, Daria Zeng, RN   Transplant Hepatology  Bethany Gonzalez MD, Teo Sheth MD, Janie Turner MD, LIBBY PAULSON MD, Maryjo Ktaz MD, Melvin Brown MD, Thomas M. Leventhal, MD   Transplant Surgery Isabella Blanco PA-C, Raffaele Brumfield MD, Elton Vera MD, Kassandra Morrell MD, MD       Re-discuss pending:    Confirmation of HCC or not by:  - Need to get biopsy slides to review at [facility] (6/14/24 at )  - get imaging from biopsy center (in PACS)      - full evaluation pending above

## 2024-10-29 NOTE — PROGRESS NOTES
[facility] Hepatology    New Patient Visit    Referring provider:  Melvin Nicholas Alfred    59 year old female    Chief complaint:  Liver transplant evaluation    HPI:  Cirrhosis  - dx May 2024  - AIH  - no hx HE, ascites or variceal bleed  - last EGD 10/24/2024- large EV  - HCC screening- see below    Patient comes to clinic this morning with her  and stepdaughter for a liver transplant evaluation for ?HCC.  Patient was diagnosed with autoimmune hepatitis in February 2023.  At that time, liver biopsy showed stage III-IV fibrosis.  Patient was started on azathioprine and monitored clinically.  In May 2024, abdominal ultrasound showed a 2 cm lesion in the left lobe of the liver.  Patient underwent MRI liver on 5/29/2024 which showed a 2.2 cm LI-RADS-4 lesion in segment 4.  Liver (?lesion) biopsy on 6/14/2024 showed cirrhosis and no evidence of HCC.  Patient underwent embolization with Y90 to segment 4 on 8/12/2024.  MRI liver from May 2024 was reviewed at liver tumor conference earlier this month.  Liver lesion was consistent with LI-RADS-4 due to the absence of washout.    Patient is well today.  She reports occasional abdominal discomfort and has reported some difficulty sleeping recently.    Patient denies jaundice, abdominal distension, lower extremity edema, lethargy or confusion.    No history of melena, hematemesis or hematochezia.    Patient denies fevers, sweats or chills.    Patient has gained 10 pounds over the last 2 months.  Appetite is normal.    Patient does not drink alcohol.  She last drank alcohol 2 years ago when she was diagnosed with autoimmune hepatitis.  Prior to this, patient would drink 1 glass of wine per day.  Patient has no history of AUD or DUIs.    Patient has never smoked.    Patient has no history of illicit or recreational substance use including marijuana, IN or IV drugs.    Medical hx Surgical hx   Past Medical History:   Diagnosis Date    Autoimmune hepatitis (H)  02/2023    Cirrhosis (H) 06/14/2024    Hypertension     Motion sickness     Osteoarthritis     PONV (postoperative nausea and vomiting)     Type 2 diabetes mellitus (H)       Past Surgical History:   Procedure Laterality Date    CYSTOSCOPY N/A 01/14/2022    Procedure: CYSTOSCOPY;  Surgeon: Bethany Anderson DO;  Location: [facility] OR    DILATION AND CURETTAGE, OPERATIVE HYSTEROSCOPY, COMBINED N/A 11/15/2021    Procedure: HYSTEROSCOPY, WITH DILATION AND CURETTAGE OF UTERUS;  Surgeon: Ping Hutchins MD;  Location: [facility] OR    LAPAROSCOPIC HYSTERECTOMY TOTAL Bilateral 01/14/2022    Procedure: TOTAL LAPAROSCOPIC HYSTERECTOMY, BILATERAL SALPINGO-OPHORECTOMY,;  Surgeon: Bethany Anderson DO;  Location: [facility] OR    ROTATOR CUFF REPAIR RT/LT Right     TONSILLECTOMY      WISDOM TOOTH EXTRACTION            Medications  Current Outpatient Medications   Medication Sig Dispense Refill    azaTHIOprine (IMURAN) 50 MG tablet Take 50 mg by mouth daily.      carvedilol (COREG) 3.125 MG tablet Take 3.125 mg by mouth 2 times daily (with meals).      FIBER ADULT GUMMIES PO       lisinopril (ZESTRIL) 20 MG tablet Take 20 mg by mouth daily       metFORMIN (GLUCOPHAGE-XR) 500 MG 24 hr tablet Take 1,000 mg by mouth daily (with dinner)      multivitamin w/minerals (THERA-VIT-M) tablet Take 1 tablet by mouth daily.      omeprazole (PRILOSEC) 40 MG DR capsule Take 40 mg by mouth daily.      predniSONE (DELTASONE) 2.5 MG tablet Take 2.5 mg by mouth daily.         Allergies  Allergies   Allergen Reactions    Penicillins Unknown and Other (See Comments)     Annotation: unsure of reaction    Since childhood   Other reaction(s): *Unknown   Annotation: unsure of reaction        Annotation: unsure of reaction     Since childhood   Other reaction(s): *Unknown   Annotation: unsure of reaction      Annotation: unsure of reaction     Since childhood   Other reaction(s): *Unknown   Annotation: unsure of reaction  "      Family hx Social hx   Family History   Problem Relation Age of Onset    Coronary Artery Disease Mother     Lymphoma Mother     Diabetes Father     Kidney Cancer Father     Testicular cancer Brother     Coronary Artery Disease Maternal Grandmother     Cerebrovascular Disease Paternal Grandfather     Cirrhosis Maternal Cousin     Alcoholism Maternal Cousin     Cirrhosis Maternal Uncle     Alcoholism Maternal Uncle     Colon Cancer Maternal Uncle 34      Social History     Tobacco Use    Smoking status: Never    Smokeless tobacco: Never   Vaping Use    Vaping status: Never Used   Substance Use Topics    Alcohol use: Not Currently     Comment: Last drink 10/2022    Drug use: Never     Patient lives in [place], [place] with her .  She has 2 stepchildren.  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     Review of systems  A 10-point review of systems was negative.    Examination  /77 (BP Location: Right arm, Patient Position: Chair, Cuff Size: Adult Regular)   Pulse 63   Temp 97.8  F (36.6  C) (Oral)   Resp 16   Ht 1.63 m (5' 4.17\")   Wt 71.4 kg (157 lb 4.8 oz)   SpO2 98%   BMI 26.86 kg/m    Body mass index is 26.86 kg/m .    Gen- well, NAD, A+Ox3, normal color  Eye- EOMI  ENT- MMM, normal oropharynx  Lym- no palpable lymphadenopathy  CVS- S1, S2 normal, no added sounds, RRR  RS- CTA  Abd- laparoscopy scars, soft, non-tender, palpable liver edge, no ascites or organomegaly on palpation or percussion, BS+  Extr- pulses good, no JAYLA  MS- hands normal- no clubbing  Neuro- A+Ox3, no asterixis  Skin- no rash or jaundice  Psych- normal mood    Laboratory  Lab Results   Component Value Date     10/29/2024    POTASSIUM 3.8 10/29/2024    CHLORIDE 104 10/29/2024    CO2 27 10/29/2024    BUN 10.9 10/29/2024    CR 0.59 10/29/2024       Lab Results   Component Value Date    BILITOTAL 1.3 10/29/2024    ALT 47 10/29/2024    AST 49 10/29/2024    ALKPHOS 115 10/29/2024       Lab Results   Component " Value Date    ALBUMIN 3.9 10/29/2024    PROTTOTAL 7.6 10/29/2024        Lab Results   Component Value Date    WBC 4.3 10/29/2024    HGB 13.0 10/29/2024     10/29/2024    PLT 94 10/29/2024       Lab Results   Component Value Date    INR 1.27 10/29/2024         Radiology  EGD 10/24/2024 reviewed  MRI liver 9/16/2024 reviewed  EGD 9/12/2024 reviewed  IR embolization 8/12/2024 reviewed  EGD 7/24/2024 reviewed  MRI liver 5/29/2024 reviewed    Assessment  59 year old female who presents for liver transplant evaluation for compensated AIH cirrhosis and possible HCC.  MELD 3.0 = 11.  No evidence of ascites or hepatic encephalopathy.  Review of prior imaging and biopsy is not consistent with HCC.  Will obtain prior liver biopsy for review at the  to evaluate for any evidence of HCC.  The patient's difficulty with sleep may be covert hepatic encephalopathy and she does have evidence of portal hypertension with esophageal varices requiring banding.  If no evidence of HCC, would monitor clinically for now but living donor liver transplant could be considered if current symptoms progress.  Up-to-date with surveillance of esophageal varices.    We discussed indications for liver transplant, liver transplant evaluation process, MELD 3.0 score and MELD exceptions, liver transplant waiting list and living donor liver transplantation.    Plan  Low Na diet  Continue azathioprine at current dose  Continue prednisone 2.5mg PO Q24  Continue carvedilol  Obtain [facility] liver biopsy for pathology review at   Follow-up in 3 months    Melvin Brown MD  Hepatology  [facility]    I spent 60 minutes on the date of the encounter doing chart review, history and exam, documentation and further activities as noted above.    On 10/28/2024, approximately 35 minutes of non face-to-face time were spent in review of the patient's medical record to date.  This included review of previous: clinic visits, hospital records, lab results,  imaging studies, and procedural documentation.  The findings from this review are summarized in the above note.

## 2024-10-29 NOTE — LETTER
"10/29/2024      Ava Miranda  [address]  [facility] [address]      Dear Colleague,    Thank you for referring your patient, Ava Miranda, to the [facility]. Please see a copy of my visit note below.    \"Much or all of the text in this note was generated through the use of Dragon Dictate voice to text software. Errors in spelling or words which appear to be out of context are unintentional, may be present due having escaped editing\"    Assessment and Plan:  1. liver transplant evaluation - patient is a good candidate overall. Benefits and surgical risks of a liver transplantation were discussed.  2.  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 Portal Vein:Patent  2. Hepatic Artery: Open  3. TIPS: absent  4. Previous Abdominal Surgery: No  5. Hepatocellular Carcinoma: 2.2 lesion in the liver; Y90 treatment in august  6. Ascites: None  7. Costal Angle: narrow  8. Portopulmonary Hypertension: absent  9. Hepatopulmonary Syndrome: absent  10. Cardiac Evaluation: had some EKG changes  11. Nutritional Status: Good  12. Diabetes: yes, type 2 on metformin  13.Hypertension yes, lisinopril  14. Smoker:no  14: Fraility index:no  15. Meets guidelines to receive Living Donor  Yes -   ...  16. Potential Living donors Yes -   potential  MELD 3.0: 11 at 10/29/2024  7:09 AM  MELD-Na: 10 at 10/29/2024  7:09 AM  Calculated from:  Serum Creatinine: 0.59 mg/dL (Using min of 1 mg/dL) at 10/29/2024  7:09 AM  Serum Sodium: 140 mmol/L (Using max of 137 mmol/L) at 10/29/2024  7:09 AM  Total Bilirubin: 1.3 mg/dL at 10/29/2024  7:09 AM  Serum Albumin: 3.9 g/dL (Using max of 3.5 g/dL) at 10/29/2024  7:09 AM  INR(ratio): 1.27 at 10/29/2024  7:09 AM  Age at listing (hypothetical): 59 years  Sex: Female at 10/29/2024  7:09 AM      Recommendations:   Reviewed the biopsy results to understand if the lesion was indeed hepatocellular carcinoma or liver tissue.  Check an alpha-fetoprotein " and complete evaluation.      Patients overall evaluation will be discussed at the Liver Transplant selection committee meeting with a final recommendation on the patients suitability for transplant to be made at that time.    Consult Full  Details:  Ava Miranda was seen in consultation at the request of Dr. Trena Hoover MD for evaluation as a potential liver transplant recipient.    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    HPI:  Presenting complaint: Suspected liver cancer    Patient was diagnosed to have autoimmune liver disease in 2023.  She was started on Imuran and subsequently she went on to develop cirrhosis of the liver.  She was also noted to have grade 2 varices which were banded.  No ascites no encephalopathy or no evidence of decompensation.  Earlier this year she was noted to have a 2.2 cm lesion in segment 4 of the liver and this was initially thought to be hepatocellular carcinoma and she received radioembolization.    No known heart disease or lung disease.  She is otherwise quite active.    She is type II diabetic for the past 5 years    Present were her daughter and  who are very supportive    Past Medical History:   Diagnosis Date     Arthritis      Diabetes (H)     Type 2     Hypertension      Motion sickness      PONV (postoperative nausea and vomiting)      Past Surgical History:   Procedure Laterality Date     CYSTOSCOPY N/A 01/14/2022    Procedure: CYSTOSCOPY;  Surgeon: Bethany Anderson DO;  Location: [facility] OR     DILATION AND CURETTAGE, OPERATIVE HYSTEROSCOPY, COMBINED N/A 11/15/2021    Procedure: HYSTEROSCOPY, WITH DILATION AND CURETTAGE OF UTERUS;  Surgeon: Ping Hutchins MD;  Location: [facility] OR     HC REMOVAL OF TONSILS,<13 Y/O      Description: Tonsillectomy;  Recorded: 09/27/2012;     LAPAROSCOPIC HYSTERECTOMY TOTAL Bilateral  01/14/2022    Procedure: TOTAL LAPAROSCOPIC HYSTERECTOMY, BILATERAL SALPINGO-OPHORECTOMY,;  Surgeon: Bethany Anderson DO;  Location: [facility] OR     LAPAROSCOPY       ORTHOPEDIC SURGERY       SHOULDER SURGERY       Past Surgical History:   Procedure Laterality Date     CYSTOSCOPY N/A 01/14/2022    Procedure: CYSTOSCOPY;  Surgeon: Bethany Anderson DO;  Location: [facility] OR     DILATION AND CURETTAGE, OPERATIVE HYSTEROSCOPY, COMBINED N/A 11/15/2021    Procedure: HYSTEROSCOPY, WITH DILATION AND CURETTAGE OF UTERUS;  Surgeon: Ping Hutchins MD;  Location: [facility] OR      REMOVAL OF TONSILS,<11 Y/O      Description: Tonsillectomy;  Recorded: 09/27/2012;     LAPAROSCOPIC HYSTERECTOMY TOTAL Bilateral 01/14/2022    Procedure: TOTAL LAPAROSCOPIC HYSTERECTOMY, BILATERAL SALPINGO-OPHORECTOMY,;  Surgeon: Bethany Anderson DO;  Location: [facility] OR     LAPAROSCOPY       ORTHOPEDIC SURGERY       SHOULDER SURGERY       No family history on file.  Allergies   Allergen Reactions     Penicillins Unknown and Other (See Comments)     Annotation: unsure of reaction    Since childhood   Other reaction(s): *Unknown   Annotation: unsure of reaction        Annotation: unsure of reaction     Since childhood   Other reaction(s): *Unknown   Annotation: unsure of reaction      Annotation: unsure of reaction     Since childhood   Other reaction(s): *Unknown   Annotation: unsure of reaction     Prior to Admission medications    Medication Sig Start Date End Date Taking? Authorizing Provider   azaTHIOprine (IMURAN) 50 MG tablet Take 50 mg by mouth daily. 10/22/24  Yes Reported, Patient   carvedilol (COREG) 3.125 MG tablet Take 3.125 mg by mouth 2 times daily (with meals). 9/23/24 9/23/25 Yes Reported, Patient   FIBER ADULT GUMMIES PO    Yes Reported, Patient   lisinopril (ZESTRIL) 20 MG tablet Take 20 mg by mouth daily  9/8/21  Yes Reported, Patient   metFORMIN (GLUCOPHAGE-XR) 500 MG 24 hr  "tablet Take 1,000 mg by mouth daily (with dinner)   Yes Unknown, Entered By History   multivitamin w/minerals (THERA-VIT-M) tablet Take 1 tablet by mouth daily.   Yes Reported, Patient   omeprazole (PRILOSEC) 40 MG DR capsule Take 40 mg by mouth daily.   Yes Reported, Patient   predniSONE (DELTASONE) 2.5 MG tablet Take 2.5 mg by mouth daily.   Yes Reported, Patient       Previous Transplant Hx: No    Cardiovascular Hx:       h/o Cardiac Issues: No       Exercise Tolerance: no chest pain or shortness of breath with exertion.    Potential Donor(s): No    ROS:    REVIEW OF SYSTEMS (check box if normal)  x                GENERAL  x                  PULMONARY x                 GENITOURINARY  x                 CNS                 xD                  CARDIAC  xL                  ENDOCRINE  xE                 EARS,NOSE,THROAT x                  GASTROINTESTINAL x                  NEUROLOGIC    x                 MUSCLOSKELTAL  x                   HEMATOLOGY    Examination:     Vitals:  /77 (BP Location: Right arm, Patient Position: Chair, Cuff Size: Adult Regular)   Pulse 68   Temp 97.8  F (36.6  C) (Oral)   Resp 18   Ht 1.63 m (5' 4.17\")   Wt 71.4 kg (157 lb 4.8 oz)   SpO2 97%   BMI 26.86 kg/m      .M      Results:   Recent Results (from the past week)   Basic metabolic panel    Collection Time: 10/29/24  7:09 AM   Result Value Ref Range    Sodium 140 135 - 145 mmol/L    Potassium 3.8 3.4 - 5.3 mmol/L    Chloride 104 98 - 107 mmol/L    Carbon Dioxide (CO2) 27 22 - 29 mmol/L    Anion Gap 9 7 - 15 mmol/L    Urea Nitrogen 10.9 6.0 - 20.0 mg/dL    Creatinine 0.59 0.51 - 0.95 mg/dL    GFR Estimate >90 >60 mL/min/1.73m2    Calcium 9.2 8.8 - 10.4 mg/dL    Glucose 179 (H) 70 - 99 mg/dL   Hepatic panel    Collection Time: 10/29/24  7:09 AM   Result Value Ref Range    Protein Total 7.6 6.4 - 8.3 g/dL    Albumin 3.9 3.5 - 5.2 g/dL    Bilirubin Total 1.3 (H) <=1.2 mg/dL    Alkaline Phosphatase 115 40 - 150 U/L    AST 49 " (H) 0 - 45 U/L    ALT 47 0 - 50 U/L    Bilirubin Direct 0.48 (H) 0.00 - 0.30 mg/dL   HCG qualitative (female only)    Collection Time: 10/29/24  7:09 AM   Result Value Ref Range    hCG Serum Qualitative Negative Negative   Iron and iron binding capacity    Collection Time: 10/29/24  7:09 AM   Result Value Ref Range    Iron 107 37 - 145 ug/dL    Iron Binding Capacity 227 (L) 240 - 430 ug/dL    Iron Sat Index 47 (H) 15 - 46 %   Phosphorus    Collection Time: 10/29/24  7:09 AM   Result Value Ref Range    Phosphorus 3.6 2.5 - 4.5 mg/dL   TSH with free T4 reflex    Collection Time: 10/29/24  7:09 AM   Result Value Ref Range    TSH 4.31 (H) 0.30 - 4.20 uIU/mL   INR    Collection Time: 10/29/24  7:09 AM   Result Value Ref Range    INR 1.27 (H) 0.85 - 1.15   CBC with platelets    Collection Time: 10/29/24  7:09 AM   Result Value Ref Range    WBC Count 4.3 4.0 - 11.0 10e3/uL    RBC Count 3.43 (L) 3.80 - 5.20 10e6/uL    Hemoglobin 13.0 11.7 - 15.7 g/dL    Hematocrit 36.9 35.0 - 47.0 %     (H) 78 - 100 fL    MCH 37.9 (H) 26.5 - 33.0 pg    MCHC 35.2 31.5 - 36.5 g/dL    RDW 15.2 (H) 10.0 - 15.0 %    Platelet Count 94 (L) 150 - 450 10e3/uL   Adult Type and Screen    Collection Time: 10/29/24  7:09 AM   Result Value Ref Range    ABO/RH(D) A POS     Antibody Screen Negative Negative    SPECIMEN EXPIRATION DATE [id number]    T4 free    Collection Time: 10/29/24  7:09 AM   Result Value Ref Range    Free T4 1.05 0.90 - 1.70 ng/dL   Protein  random urine    Collection Time: 10/29/24  7:25 AM   Result Value Ref Range    Total Protein Urine mg/dL 19.9   mg/dL    Total Protein Urine mg/mg Creat 0.09 0.00 - 0.20 mg/mg Cr    Creatinine Urine mg/dL 223.0 mg/dL   UA Macroscopic with reflex to Microscopic and Culture    Collection Time: 10/29/24  7:25 AM    Specimen: Urine, Midstream   Result Value Ref Range    Color Urine Yellow Colorless, Straw, Light Yellow, Yellow    Appearance Urine Clear Clear    Glucose Urine Negative  Negative mg/dL    Bilirubin Urine Negative Negative    Ketones Urine 10 (A) Negative mg/dL    Specific Gravity Urine 1.029 1.003 - 1.035    Blood Urine Negative Negative    pH Urine 7.0 5.0 - 7.0    Protein Albumin Urine 20 (A) Negative mg/dL    Urobilinogen Urine 8.0 (A) Normal, 2.0 mg/dL    Nitrite Urine Negative Negative    Leukocyte Esterase Urine Large (A) Negative    Mucus Urine Present (A) None Seen /LPF    RBC Urine 3 (H) <=2 /HPF    WBC Urine 6 (H) <=5 /HPF    Squamous Epithelials Urine 8 (H) <=1 /HPF     I had a long discussion with the patient regarding liver transplantation which included but was not limited to  the following points:    Liver transplant selection committee process.  The federal rules for cadaveric waiting list, the size and blood type matching of the organ. The availability of living-related donor transplantation.  The types of donors: brain death donors, non-heart beating donors, partial liver grafts: splits and living donor grafts  Extended criteria  Donors (older age, steasosis) and the increased  risk of primary non-function using the extended criteria donors  The [facility] high risk donors,  Risk of donor transmitted infections and donor transmitted malignancy  The liver transplant operation and the associated risks and technical complications which can include intraoperative death, post operative death,  Primary non-function, bleeding requiring re-operations, arterial and biliary complications, bowel perforations, and intra abdominal abscess. Some of these complicaitons may require a second operation.  The postoperative course, the ICU stay and risk of postoperative complications which can include sepsis, MI, stroke, brain injury, pneumonia, pleural effusions, and renal dysfunction.  The current 1 year and 5 year graft and patient survivals.  The need for life long immunosuppressive therapy and the side effects of these medications, including the possibility of toxicity, opportunistic  infections, risk of cancer including lymphoma, and the possibility of rejection even if the patient is taking the medication exactly as prescribed.  The need for compliance with medications and follow-up visits in the clinic and thereafter.  The patient and family understand these risks and wish to proceed to transplantation           Again, thank you for allowing me to participate in the care of your patient.        Sincerely,        Elton Vera MD

## 2024-10-29 NOTE — PROGRESS NOTES
"\"Much or all of the text in this note was generated through the use of Dragon Dictate voice to text software. Errors in spelling or words which appear to be out of context are unintentional, may be present due having escaped editing\"    Assessment and Plan:  1. liver transplant evaluation - patient is a good candidate overall. Benefits and surgical risks of a liver transplantation were discussed.  2.  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 Portal Vein:Patent  2. Hepatic Artery: Open  3. TIPS: absent  4. Previous Abdominal Surgery: No  5. Hepatocellular Carcinoma: 2.2 lesion in the liver; Y90 treatment in august  6. Ascites: None  7. Costal Angle: narrow  8. Portopulmonary Hypertension: absent  9. Hepatopulmonary Syndrome: absent  10. Cardiac Evaluation: had some EKG changes  11. Nutritional Status: Good  12. Diabetes: yes, type 2 on metformin  13.Hypertension yes, lisinopril  14. Smoker:no  14: Fraility index:no  15. Meets guidelines to receive Living Donor  Yes -   ...  16. Potential Living donors Yes -   potential  MELD 3.0: 11 at 10/29/2024  7:09 AM  MELD-Na: 10 at 10/29/2024  7:09 AM  Calculated from:  Serum Creatinine: 0.59 mg/dL (Using min of 1 mg/dL) at 10/29/2024  7:09 AM  Serum Sodium: 140 mmol/L (Using max of 137 mmol/L) at 10/29/2024  7:09 AM  Total Bilirubin: 1.3 mg/dL at 10/29/2024  7:09 AM  Serum Albumin: 3.9 g/dL (Using max of 3.5 g/dL) at 10/29/2024  7:09 AM  INR(ratio): 1.27 at 10/29/2024  7:09 AM  Age at listing (hypothetical): 59 years  Sex: Female at 10/29/2024  7:09 AM      Recommendations:   Reviewed the biopsy results to understand if the lesion was indeed hepatocellular carcinoma or liver tissue.  Check an alpha-fetoprotein and complete evaluation.      Patients overall evaluation will be discussed at the Liver Transplant selection committee meeting with a final recommendation on the patients suitability for transplant to be made at that " time.    Consult Full  Details:  Ava Miranda was seen in consultation at the request of Dr. Trena Hoover MD for evaluation as a potential liver transplant recipient.    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    HPI:  Presenting complaint: Suspected liver cancer    Patient was diagnosed to have autoimmune liver disease in 2023.  She was started on Imuran and subsequently she went on to develop cirrhosis of the liver.  She was also noted to have grade 2 varices which were banded.  No ascites no encephalopathy or no evidence of decompensation.  Earlier this year she was noted to have a 2.2 cm lesion in segment 4 of the liver and this was initially thought to be hepatocellular carcinoma and she received radioembolization.    No known heart disease or lung disease.  She is otherwise quite active.    She is type II diabetic for the past 5 years    Present were her daughter and  who are very supportive    Past Medical History:   Diagnosis Date    Arthritis     Diabetes (H)     Type 2    Hypertension     Motion sickness     PONV (postoperative nausea and vomiting)      Past Surgical History:   Procedure Laterality Date    CYSTOSCOPY N/A 01/14/2022    Procedure: CYSTOSCOPY;  Surgeon: Bethany Anderson DO;  Location: [facility] OR    DILATION AND CURETTAGE, OPERATIVE HYSTEROSCOPY, COMBINED N/A 11/15/2021    Procedure: HYSTEROSCOPY, WITH DILATION AND CURETTAGE OF UTERUS;  Surgeon: Ping Hutchins MD;  Location: [facility] OR    HC REMOVAL OF TONSILS,<13 Y/O      Description: Tonsillectomy;  Recorded: 09/27/2012;    LAPAROSCOPIC HYSTERECTOMY TOTAL Bilateral 01/14/2022    Procedure: TOTAL LAPAROSCOPIC HYSTERECTOMY, BILATERAL SALPINGO-OPHORECTOMY,;  Surgeon: Bethany Anderson DO;  Location: [facility] OR    [facility]      ORTHOPEDIC SURGERY      SHOULDER SURGERY       Past  Surgical History:   Procedure Laterality Date    CYSTOSCOPY N/A 01/14/2022    Procedure: CYSTOSCOPY;  Surgeon: Bethany Anderson DO;  Location: [facility] OR    DILATION AND CURETTAGE, OPERATIVE HYSTEROSCOPY, COMBINED N/A 11/15/2021    Procedure: HYSTEROSCOPY, WITH DILATION AND CURETTAGE OF UTERUS;  Surgeon: Ping Hutchins MD;  Location: [facility] OR     REMOVAL OF TONSILS,<13 Y/O      Description: Tonsillectomy;  Recorded: 09/27/2012;    LAPAROSCOPIC HYSTERECTOMY TOTAL Bilateral 01/14/2022    Procedure: TOTAL LAPAROSCOPIC HYSTERECTOMY, BILATERAL SALPINGO-OPHORECTOMY,;  Surgeon: Bethany Anderson DO;  Location: [facility] OR    LAPAROSCOPY      ORTHOPEDIC SURGERY      SHOULDER SURGERY       No family history on file.  Allergies   Allergen Reactions    Penicillins Unknown and Other (See Comments)     Annotation: unsure of reaction    Since childhood   Other reaction(s): *Unknown   Annotation: unsure of reaction        Annotation: unsure of reaction     Since childhood   Other reaction(s): *Unknown   Annotation: unsure of reaction      Annotation: unsure of reaction     Since childhood   Other reaction(s): *Unknown   Annotation: unsure of reaction     Prior to Admission medications    Medication Sig Start Date End Date Taking? Authorizing Provider   azaTHIOprine (IMURAN) 50 MG tablet Take 50 mg by mouth daily. 10/22/24  Yes Reported, Patient   carvedilol (COREG) 3.125 MG tablet Take 3.125 mg by mouth 2 times daily (with meals). 9/23/24 9/23/25 Yes Reported, Patient   FIBER ADULT GUMMIES PO    Yes Reported, Patient   lisinopril (ZESTRIL) 20 MG tablet Take 20 mg by mouth daily  9/8/21  Yes Reported, Patient   metFORMIN (GLUCOPHAGE-XR) 500 MG 24 hr tablet Take 1,000 mg by mouth daily (with dinner)   Yes Unknown, Entered By History   multivitamin w/minerals (THERA-VIT-M) tablet Take 1 tablet by mouth daily.   Yes Reported, Patient   omeprazole (PRILOSEC) 40 MG DR capsule Take 40 mg by  "mouth daily.   Yes Reported, Patient   predniSONE (DELTASONE) 2.5 MG tablet Take 2.5 mg by mouth daily.   Yes Reported, Patient       Previous Transplant Hx: No    Cardiovascular Hx:       h/o Cardiac Issues: No       Exercise Tolerance: no chest pain or shortness of breath with exertion.    Potential Donor(s): No    ROS:    REVIEW OF SYSTEMS (check box if normal)  x                GENERAL  x                  PULMONARY x                 GENITOURINARY  x                 CNS                 xD                  CARDIAC  xL                  ENDOCRINE  xE                 EARS,NOSE,THROAT x                  GASTROINTESTINAL x                  NEUROLOGIC    x                 MUSCLOSKELTAL  x                   HEMATOLOGY    Examination:     Vitals:  /77 (BP Location: Right arm, Patient Position: Chair, Cuff Size: Adult Regular)   Pulse 68   Temp 97.8  F (36.6  C) (Oral)   Resp 18   Ht 1.63 m (5' 4.17\")   Wt 71.4 kg (157 lb 4.8 oz)   SpO2 97%   BMI 26.86 kg/m      .M      Results:   Recent Results (from the past week)   Basic metabolic panel    Collection Time: 10/29/24  7:09 AM   Result Value Ref Range    Sodium 140 135 - 145 mmol/L    Potassium 3.8 3.4 - 5.3 mmol/L    Chloride 104 98 - 107 mmol/L    Carbon Dioxide (CO2) 27 22 - 29 mmol/L    Anion Gap 9 7 - 15 mmol/L    Urea Nitrogen 10.9 6.0 - 20.0 mg/dL    Creatinine 0.59 0.51 - 0.95 mg/dL    GFR Estimate >90 >60 mL/min/1.73m2    Calcium 9.2 8.8 - 10.4 mg/dL    Glucose 179 (H) 70 - 99 mg/dL   Hepatic panel    Collection Time: 10/29/24  7:09 AM   Result Value Ref Range    Protein Total 7.6 6.4 - 8.3 g/dL    Albumin 3.9 3.5 - 5.2 g/dL    Bilirubin Total 1.3 (H) <=1.2 mg/dL    Alkaline Phosphatase 115 40 - 150 U/L    AST 49 (H) 0 - 45 U/L    ALT 47 0 - 50 U/L    Bilirubin Direct 0.48 (H) 0.00 - 0.30 mg/dL   HCG qualitative (female only)    Collection Time: 10/29/24  7:09 AM   Result Value Ref Range    hCG Serum Qualitative Negative Negative   Iron and iron " binding capacity    Collection Time: 10/29/24  7:09 AM   Result Value Ref Range    Iron 107 37 - 145 ug/dL    Iron Binding Capacity 227 (L) 240 - 430 ug/dL    Iron Sat Index 47 (H) 15 - 46 %   Phosphorus    Collection Time: 10/29/24  7:09 AM   Result Value Ref Range    Phosphorus 3.6 2.5 - 4.5 mg/dL   TSH with free T4 reflex    Collection Time: 10/29/24  7:09 AM   Result Value Ref Range    TSH 4.31 (H) 0.30 - 4.20 uIU/mL   INR    Collection Time: 10/29/24  7:09 AM   Result Value Ref Range    INR 1.27 (H) 0.85 - 1.15   CBC with platelets    Collection Time: 10/29/24  7:09 AM   Result Value Ref Range    WBC Count 4.3 4.0 - 11.0 10e3/uL    RBC Count 3.43 (L) 3.80 - 5.20 10e6/uL    Hemoglobin 13.0 11.7 - 15.7 g/dL    Hematocrit 36.9 35.0 - 47.0 %     (H) 78 - 100 fL    MCH 37.9 (H) 26.5 - 33.0 pg    MCHC 35.2 31.5 - 36.5 g/dL    RDW 15.2 (H) 10.0 - 15.0 %    Platelet Count 94 (L) 150 - 450 10e3/uL   Adult Type and Screen    Collection Time: 10/29/24  7:09 AM   Result Value Ref Range    ABO/RH(D) A POS     Antibody Screen Negative Negative    SPECIMEN EXPIRATION DATE [id number]    T4 free    Collection Time: 10/29/24  7:09 AM   Result Value Ref Range    Free T4 1.05 0.90 - 1.70 ng/dL   Protein  random urine    Collection Time: 10/29/24  7:25 AM   Result Value Ref Range    Total Protein Urine mg/dL 19.9   mg/dL    Total Protein Urine mg/mg Creat 0.09 0.00 - 0.20 mg/mg Cr    Creatinine Urine mg/dL 223.0 mg/dL   UA Macroscopic with reflex to Microscopic and Culture    Collection Time: 10/29/24  7:25 AM    Specimen: Urine, Midstream   Result Value Ref Range    Color Urine Yellow Colorless, Straw, Light Yellow, Yellow    Appearance Urine Clear Clear    Glucose Urine Negative Negative mg/dL    Bilirubin Urine Negative Negative    Ketones Urine 10 (A) Negative mg/dL    Specific Gravity Urine 1.029 1.003 - 1.035    Blood Urine Negative Negative    pH Urine 7.0 5.0 - 7.0    Protein Albumin Urine 20 (A) Negative mg/dL     Urobilinogen Urine 8.0 (A) Normal, 2.0 mg/dL    Nitrite Urine Negative Negative    Leukocyte Esterase Urine Large (A) Negative    Mucus Urine Present (A) None Seen /LPF    RBC Urine 3 (H) <=2 /HPF    WBC Urine 6 (H) <=5 /HPF    Squamous Epithelials Urine 8 (H) <=1 /HPF     I had a long discussion with the patient regarding liver transplantation which included but was not limited to  the following points:    Liver transplant selection committee process.  The federal rules for cadaveric waiting list, the size and blood type matching of the organ. The availability of living-related donor transplantation.  The types of donors: brain death donors, non-heart beating donors, partial liver grafts: splits and living donor grafts  Extended criteria  Donors (older age, steasosis) and the increased  risk of primary non-function using the extended criteria donors  [facility] high risk donors,  Risk of donor transmitted infections and donor transmitted malignancy  The liver transplant operation and the associated risks and technical complications which can include intraoperative death, post operative death,  Primary non-function, bleeding requiring re-operations, arterial and biliary complications, bowel perforations, and intra abdominal abscess. Some of these complicaitons may require a second operation.  The postoperative course, the ICU stay and risk of postoperative complications which can include sepsis, MI, stroke, brain injury, pneumonia, pleural effusions, and renal dysfunction.  The current 1 year and 5 year graft and patient survivals.  The need for life long immunosuppressive therapy and the side effects of these medications, including the possibility of toxicity, opportunistic infections, risk of cancer including lymphoma, and the possibility of rejection even if the patient is taking the medication exactly as prescribed.  The need for compliance with medications and follow-up visits in the clinic and thereafter.  The  patient and family understand these risks and wish to proceed to transplantation

## 2024-10-29 NOTE — PATIENT INSTRUCTIONS
"Nutrition & Frailty for Transplant Recipients  With end-stage organ disease, you are at higher risk for malnutrition, deconditioning, frailty, and a reduced functional status. The goal is to have a well-maintained nutritional status (or a good nutritional reserve). Once you \"lose\" your nutritional buffer or functional status, it is very hard to get back. The more functional you are, the better you will do with recovering from surgery, etc.     Functional Status:  - Combined with good nutrition, maintaining functional status will help keep you strong enough for surgery.   - Do what activity you can tolerate. Please ask your Physician for a Physical Therapy referral if you would find this helpful.     Nutrition:  - Protein foods are the building blocks of muscle. Try to get protein at each meal and snack time. Including protein before bed is also helpful to carry you through the night when your muscle is more prone to breakdown.   - Sometimes eating protein is more work than drinking liquids. If this is the case, please supplement with a pre-made protein shake or make your own smoothie with protein powder, milk/water, yogurt, fruit, nuts/casey seeds, etc  - Small volume protein supplements: Prostat or Liquacel (can be found on Amazon). 1 ounce drink has 15 grams of protein.    Sources of Protein  For animal proteins (chicken, beef, fish), deck of cards size is approximately 3 ounces or 24 grams protein.  Food Portion  Grams of Protein   Animal proteins (chicken, turkey, venison, fish/seafood, red meat) 1 ounce  7-9   Egg  1  6   Cottage cheese  1/4 cup  7    Cheese  1 ounce (1 slice, 1 cheese stick) 6   Milk (cow's) 4 ounces or 1/2 cup  4   Dry skim milk powder 2 Tbsp  4   Ricotta cheese  1/4 cup  7    [place] Instant Breakfast (made with milk) 1/2 cup  7   Pudding 1/2 cup  4   Yogurt (ie Yoplait) 1/2 cup  5   Greek yogurt 5 oz container 15   Tofu  1/4 cup  5   Soymilk  1/2 cup  3   Tempeh  1/4 cup  8   Lentils  1/4 " cup 5   Kidney beans, black beans, etc 1/4 cup  4   Chickpeas 1/4 cup  4   Veggie burger  1 amalia  7   Soy nuts  1/4 cup  17   Sunflower seeds  2 Tbsp  8   Peanuts  1 ounce 7   Almonds  15 6   Pistachios 25 6   Peanut/almond butter 2 Tbsp  8    My favorite protein bars are Atkins wafer cookie or Power Crunch protein bars- wafer cookie type texture.     Liver Health & Nutrition      Visit [email or web address].ca to review valuable information about nutrition and exercise     Nutrition  - For patients with cirrhosis, it is very important to eat the right types and amounts of foods.  We recommend a diet low in carbohydrates/sugars and high in fresh fruits/vegetables, with the right amount of protein.  We typically recommend  grams of protein every day. Your Registered Dietitian will be able to calculate your estimated protein needs based on your weight and degree of muscle wasting associated with your liver disease.  - In regard to protein intake, you can focus on: red meat, poultry, cooked fish and seafood, vegetable-based protein meat products/meat substitutes (Beyond Burgers, etc), tofu and other soy products, eggs, beans/legumes, dairy products (including milk and yogurt and cheese), unsalted nuts, nut butters, etc.  - You should eat at least three meals a day and three to four snacks between meals. Your goal is to include protein at each meal and snack. Focus on eating protein first, then if still hungry, go for the vegetables, fruit, starches, etc.   - Bedtime snacks are especially important (preferrably something with some protein) - toast or fruit with peanut butter, spoonful of peanut butter, Greek yogurt, handful of nuts, string cheese stick, boiled egg, protein bar  - Patients with malnutrition and/or loss of muscle mass can improve their nutrition and muscle mass by drinking at least 2 protein drinks per day. A good time of day to drink one of these is before bed, when your body is preparing to be in  "the \"fasted\" state all night and muscle mass may break down more readily. Some options include: Ensure, Boost, Glucerna, Premier Protein, FairLife, Orgain, or powdered whey protein (or similar supplements) mixed with milk, yogurt, fruit, peanut butter, etc.   - Clear protein drinks: Premier Protein Clear, Ensure Clear, Razsepp50, Boost Breeze Clear, Wicked Whey Protein Powder  - Please avoid eating raw seafood, especially shellfish, because of risk of serious illness  - We recommend all patients with cirrhosis limit their daily sodium intake to less than 2000 mg. Consider tracking your current sodium intake for 3 days on pen/paper or with an joycelyn like [employer] to understand where most of your sodium is coming from, what you might need to modify in your diet to remain within the 2000 mg/day budget, etc. Consider a lower sodium frozen meal (Healthy Choice, Lean Cuisine, Smart Ones) in a pinch.     Online low sodium store:   https://Mass Relevance/    Low Sodium Recipe Blogs/Websites     https://Vaccibody.us/recipe-index/    [email or web address] [email or web address]/dinner-recipes/    [email or web address] [email or web address]/recipes    https://Avantha/recipes    Inna MODI CCTD  Transplant Dietitian         "

## 2024-10-29 NOTE — LETTER
10/29/2024      Ava Miranda  [address]  [facility] [address]      Dear Colleague,    Thank you for referring your patient, Ava Miranda, to the [facility] TRANSPLANT [facility]. Please see a copy of my visit note below.    [facility] Hepatology    New Patient Visit    Referring provider:  Melvin Nicholas Alfred    59 year old female    Chief complaint:  Liver transplant evaluation    HPI:  Cirrhosis  - dx May 2024  - AIH  - no hx HE, ascites or variceal bleed  - last EGD 10/24/2024- large EV  - HCC screening- see below    Patient comes to clinic this morning with her  and stepdaughter for a liver transplant evaluation for ?HCC.  Patient was diagnosed with autoimmune hepatitis in February 2023.  At that time, liver biopsy showed stage III-IV fibrosis.  Patient was started on azathioprine and monitored clinically.  In May 2024, abdominal ultrasound showed a 2 cm lesion in the left lobe of the liver.  Patient underwent MRI liver on 5/29/2024 which showed a 2.2 cm LI-RADS-4 lesion in segment 4.  Liver (?lesion) biopsy on 6/14/2024 showed cirrhosis and no evidence of HCC.  Patient underwent embolization with Y90 to segment 4 on 8/12/2024.  MRI liver from May 2024 was reviewed at liver tumor conference earlier this month.  Liver lesion was consistent with LI-RADS-4 due to the absence of washout.    Patient is well today.  She reports occasional abdominal discomfort and has reported some difficulty sleeping recently.    Patient denies jaundice, abdominal distension, lower extremity edema, lethargy or confusion.    No history of melena, hematemesis or hematochezia.    Patient denies fevers, sweats or chills.    Patient has gained 10 pounds over the last 2 months.  Appetite is normal.    Patient does not drink alcohol.  She last drank alcohol 2 years ago when she was diagnosed with autoimmune hepatitis.  Prior to this, patient would drink 1 glass of wine per day.  Patient has no history of AUD or  [place]ali.    Patient has never smoked.    Patient has no history of illicit or recreational substance use including marijuana, IN or IV drugs.    Medical hx Surgical hx   Past Medical History:   Diagnosis Date     Autoimmune hepatitis (H) 02/2023     Cirrhosis (H) 06/14/2024     Hypertension      Motion sickness      Osteoarthritis      PONV (postoperative nausea and vomiting)      Type 2 diabetes mellitus (H)       Past Surgical History:   Procedure Laterality Date     CYSTOSCOPY N/A 01/14/2022    Procedure: CYSTOSCOPY;  Surgeon: Bethany Anderson DO;  Location: [facility] OR     DILATION AND CURETTAGE, OPERATIVE HYSTEROSCOPY, COMBINED N/A 11/15/2021    Procedure: HYSTEROSCOPY, WITH DILATION AND CURETTAGE OF UTERUS;  Surgeon: Ping Hutchins MD;  Location: [facility] OR     LAPAROSCOPIC HYSTERECTOMY TOTAL Bilateral 01/14/2022    Procedure: TOTAL LAPAROSCOPIC HYSTERECTOMY, BILATERAL SALPINGO-OPHORECTOMY,;  Surgeon: Bethany Anderson DO;  Location: [facility] OR     ROTATOR CUFF REPAIR RT/LT Right      TONSILLECTOMY       WISDOM TOOTH EXTRACTION            Medications  Current Outpatient Medications   Medication Sig Dispense Refill     azaTHIOprine (IMURAN) 50 MG tablet Take 50 mg by mouth daily.       carvedilol (COREG) 3.125 MG tablet Take 3.125 mg by mouth 2 times daily (with meals).       FIBER ADULT GUMMIES PO        lisinopril (ZESTRIL) 20 MG tablet Take 20 mg by mouth daily        metFORMIN (GLUCOPHAGE-XR) 500 MG 24 hr tablet Take 1,000 mg by mouth daily (with dinner)       multivitamin w/minerals (THERA-VIT-M) tablet Take 1 tablet by mouth daily.       omeprazole (PRILOSEC) 40 MG DR capsule Take 40 mg by mouth daily.       predniSONE (DELTASONE) 2.5 MG tablet Take 2.5 mg by mouth daily.         Allergies  Allergies   Allergen Reactions     Penicillins Unknown and Other (See Comments)     Annotation: unsure of reaction    Since childhood   Other reaction(s): *Unknown   Annotation:  "unsure of reaction        Annotation: unsure of reaction     Since childhood   Other reaction(s): *Unknown   Annotation: unsure of reaction      Annotation: unsure of reaction     Since childhood   Other reaction(s): *Unknown   Annotation: unsure of reaction       Family hx Social hx   Family History   Problem Relation Age of Onset     Coronary Artery Disease Mother      Lymphoma Mother      Diabetes Father      Kidney Cancer Father      Testicular cancer Brother      Coronary Artery Disease Maternal Grandmother      Cerebrovascular Disease Paternal Grandfather      Cirrhosis Maternal Cousin      Alcoholism Maternal Cousin      Cirrhosis Maternal Uncle      Alcoholism Maternal Uncle      Colon Cancer Maternal Uncle 34      Social History     Tobacco Use     Smoking status: Never     Smokeless tobacco: Never   Vaping Use     Vaping status: Never Used   Substance Use Topics     Alcohol use: Not Currently     Comment: Last drink 10/2022     Drug use: Never     Patient lives in [place], [place] with her .  She has 2 stepchildren.  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     Review of systems  A 10-point review of systems was negative.    Examination  /77 (BP Location: Right arm, Patient Position: Chair, Cuff Size: Adult Regular)   Pulse 63   Temp 97.8  F (36.6  C) (Oral)   Resp 16   Ht 1.63 m (5' 4.17\")   Wt 71.4 kg (157 lb 4.8 oz)   SpO2 98%   BMI 26.86 kg/m    Body mass index is 26.86 kg/m .    Gen- well, NAD, A+Ox3, normal color  Eye- EOMI  ENT- MMM, normal oropharynx  Lym- no palpable lymphadenopathy  CVS- S1, S2 normal, no added sounds, RRR  RS- CTA  Abd- laparoscopy scars, soft, non-tender, palpable liver edge, no ascites or organomegaly on palpation or percussion, BS+  Extr- pulses good, no JAYLA  MS- hands normal- no clubbing  Neuro- A+Ox3, no asterixis  Skin- no rash or jaundice  Psych- normal mood    Laboratory  Lab Results   Component Value Date     10/29/2024    POTASSIUM " 3.8 10/29/2024    CHLORIDE 104 10/29/2024    CO2 27 10/29/2024    BUN 10.9 10/29/2024    CR 0.59 10/29/2024       Lab Results   Component Value Date    BILITOTAL 1.3 10/29/2024    ALT 47 10/29/2024    AST 49 10/29/2024    ALKPHOS 115 10/29/2024       Lab Results   Component Value Date    ALBUMIN 3.9 10/29/2024    PROTTOTAL 7.6 10/29/2024        Lab Results   Component Value Date    WBC 4.3 10/29/2024    HGB 13.0 10/29/2024     10/29/2024    PLT 94 10/29/2024       Lab Results   Component Value Date    INR 1.27 10/29/2024         Radiology  EGD 10/24/2024 reviewed  MRI liver 9/16/2024 reviewed  EGD 9/12/2024 reviewed  IR embolization 8/12/2024 reviewed  EGD 7/24/2024 reviewed  MRI liver 5/29/2024 reviewed    Assessment  59 year old female who presents for liver transplant evaluation for compensated AIH cirrhosis and possible HCC.  MELD 3.0 = 11.  No evidence of ascites or hepatic encephalopathy.  Review of prior imaging and biopsy is not consistent with HCC.  Will obtain prior liver biopsy for review at the  to evaluate for any evidence of HCC.  The patient's difficulty with sleep may be covert hepatic encephalopathy and she does have evidence of portal hypertension with esophageal varices requiring banding.  If no evidence of HCC, would monitor clinically for now but living donor liver transplant could be considered if current symptoms progress.  Up-to-date with surveillance of esophageal varices.    We discussed indications for liver transplant, liver transplant evaluation process, MELD 3.0 score and MELD exceptions, liver transplant waiting list and living donor liver transplantation.    Plan  Low Na diet  Continue azathioprine at current dose  Continue prednisone 2.5mg PO Q24  Continue carvedilol  Obtain [facility] liver biopsy for pathology review at   Follow-up in 3 months    Melvin Brown MD  Hepatology  [facility]    I spent 60 minutes on the date of the encounter doing chart review, history and  exam, documentation and further activities as noted above.    On 10/28/2024, approximately 35 minutes of non face-to-face time were spent in review of the patient's medical record to date.  This included review of previous: clinic visits, hospital records, lab results, imaging studies, and procedural documentation.  The findings from this review are summarized in the above note.      Again, thank you for allowing me to participate in the care of your patient.        Sincerely,        Melvin Brown MD

## 2024-10-30 LAB
BACTERIA UR CULT: NORMAL
ETHYL GLUCURONIDE UR QL SCN: NORMAL NG/ML
GAMMA INTERFERON BACKGROUND BLD IA-ACNC: 0 IU/ML
M TB IFN-G BLD-IMP: NEGATIVE
M TB IFN-G CD4+ BCKGRND COR BLD-ACNC: 4.03 IU/ML
MITOGEN IGNF BCKGRD COR BLD-ACNC: 0 IU/ML
MITOGEN IGNF BCKGRD COR BLD-ACNC: 0 IU/ML
QUANTIFERON MITOGEN: 4.03 IU/ML
QUANTIFERON NIL TUBE: 0 IU/ML
QUANTIFERON TB1 TUBE: 0 IU/ML
QUANTIFERON TB2 TUBE: 0

## 2024-10-31 LAB
LABORATORY REPORT: NORMAL
PETH INTERPRETATION: NORMAL
PLPETH BLD-MCNC: <10 NG/ML
POPETH BLD-MCNC: <10 NG/ML

## 2024-11-02 LAB
ETHYL GLUCURONIDE UR CFM-MCNC: <100 NG/ML
ETHYL SULFATE UR CFM-MCNC: <100 NG/ML

## 2024-12-06 ENCOUNTER — LAB REQUISITION (OUTPATIENT)
Dept: LAB | Facility: CLINIC | Age: 59
End: 2024-12-06
Payer: COMMERCIAL

## 2024-12-06 PROCEDURE — 88321 CONSLTJ&REPRT SLD PREP ELSWR: CPT | Performed by: STUDENT IN AN ORGANIZED HEALTH CARE EDUCATION/TRAINING PROGRAM

## 2024-12-07 ENCOUNTER — HEALTH MAINTENANCE LETTER (OUTPATIENT)
Age: 59
End: 2024-12-07

## 2024-12-09 LAB
PATH REPORT.COMMENTS IMP SPEC: NORMAL
PATH REPORT.FINAL DX SPEC: NORMAL
PATH REPORT.GROSS SPEC: NORMAL
PATH REPORT.MICROSCOPIC SPEC OTHER STN: NORMAL
PATH REPORT.RELEVANT HX SPEC: NORMAL
PATH REPORT.RELEVANT HX SPEC: NORMAL
PATH REPORT.SITE OF ORIGIN SPEC: NORMAL

## 2025-01-07 ENCOUNTER — TELEPHONE (OUTPATIENT)
Dept: TRANSPLANT | Facility: CLINIC | Age: 60
End: 2025-01-07
Payer: COMMERCIAL

## 2025-01-07 NOTE — PROGRESS NOTES
Transplant Social Work Services Phone Call      Data: Ava is being evaluated for a liver transplant.  Intervention: I received a call from Ava requesting support for her mental health.  Assessment: Ava does not have a mental health history, though she has worked with a therapy provider in the past.  Ava reports with the progression of her liver disease she is needing more support for her mental health.  She does participate in our liver transplant support group and has found it helpful.  Ava reports having strong valdez and an involved support system.  Education provided by SW: virtual liver transplant support group and 2025 in person dates  Plan: Message to Alfred Gee RN transplant coordinator to address patient's new symptoms.  Message to AYALA Gregory [facility] psychologist to see if he is able to accept Ava as a new patient.       CHARANJIT Sahni, [facility]  Liver Transplant   Phone [phone]

## 2025-01-08 NOTE — PROGRESS NOTES
[facility] Hepatology    Assessment  59 year old female with PMHx of compensated AIH related cirrhosis. PMHx of type II diabetes and HTN    #. Compensated autoimmune related hepatitis cirrhosis  #. ?Hepatic Encephalopathy  MELD 3.0: 10    Patient with compensated AIH related cirrhosis. Patient with symptoms of brain fog that are interfering with her work - no asterixis on exam. Patient has 4-6 bowel movements per day, thus will defer lactulose and start rifaximin 550mg BID. No history of ascites or history of variceal bleeding. Patient is due for variceal screening in the upcoming month.     She is currently on azathioprine and prednisone for AIH management. She has mildly elevated AST and ALT, but suspect this related to her overweight status. She is on coreg given hx of varices, will increase to goal directed medical dose of 6.25mg BID.     #. 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 Given this, she is not a candidate for MELD exception points for consideration of liver transplantation.    Liver Transplantation  Discussed at this time she is a generally well compensated with a MELD of 10. She is not a candidate for MELD exception points for consideration of liver transplantation. Given this, we discussed will not continue the liver transplant evaluation. If she were to develop further decompensation or proven HCC, we would consider initiating the liver transplant evaluation at that time.     #. Osteopenia    #. Loose stools  Patient with several months of loose stools without melena or hematochezia. Colonoscopy 11/2024 with multiple polyps removed. No biopsies performed for microscopic colitis. Repeat TSH checked in clinic - within normal limits. Unlikely celiac disease, but pending TTg IgA/IgG. Plan to increase fiber and if no improvement, will consider further workup and management.     #. Overweight, BMI > 25  Discussed lifestyle and dietary  modifications for weight loss especially given she is on prednisone + a post-menopausal female.     #. Anxiety    Plan  -- Follow up Ttg IgA/IgG  -- Start rifaximin 550mg BID for possible hepatic encephalopathy    * Patient to reach out in 2 weeks; if no improvement with rifaximin, may consider further workup given concurrent mental health issues  -- Continue azathioprine 50mg daily + prednisone 2.5mg PO Q24   * IgG every 3 months   * Given increased risk of skin cancer, recommend yearly skin cancer exams (already doing every 6 months)   * CBC every 3 months while on azathioprine   -- Increase carvedilol to 6.25mg BID; if low blood pressures, can consider reducing her lisinopril  -- MRI + AFP every 3 months for 1 year since Y-90 for LR-4 lesion (not proven HCC); after that will space our surveillance to every 6 months  -- Variceal Screening next due 1/22/2025; scheduled at OSH  -- Vitamin D + calcium supplementation for osteopenia   -- Increase fiber (metamucil daily) given loose stools; encouraged with plenty of water  -- Follow up with PCP re: mental health concerns; no concern re: starting lexapro    Health Maintenance:  -- Recommend yearly influenza vaccination   -- Recommend dental visits every 6 months  -- Colon Cancer Screening: due 2027  -- Breast Cancer Screening: mammogram per PCP   -- Cervical Cancer Screening: pap smear + HPV co-testing  per PCP     Nutrition & Physical Activity:  -- Recommend low sodium (< 2 grams per day) + high protein + low carbohydrate    RTC: 3 months    Bethany Gonzalez MD (Lizzie)  Advanced & Transplant Hepatology  [facility]    I spent 90 minutes on this encounter performing the following: reviewing the patient's medical record (clinic visits, hospital records, lab results, imaging and procedural documentation), history taking, physical exam and documentation on the date of the encounter. I also spent part of the time in coordination of care and  counseling.    The longitudinal plan of care for the diagnosis(es)/condition(s) as documented were addressed during this visit. Due to the added complexity in care, I will continue to support Ava in the subsequent management and with ongoing continuity of care.    HPI:  AIH Cirrhosis  - dx ~ 5/2024  - no hx HE  - no hx ascites  - no hx variceal bleed  - last EGD 12/2024 - grade II EV s/p banding   - TPMT: normal 2023    Liver Lesion: Not confirmed HCC, LR-4  - 8/12/2024: TARE/Y90 to segment 4  - 12/6/2024 pathology review at [facility]: Chronic hepatitis with mild activity and established cirrhosis. Negative for neoplasm.     History:   - Patient was diagnosed with autoimmune hepatitis in February 2023.  At that time, liver biopsy showed stage III-IV fibrosis.  Patient was started on azathioprine and monitored clinically.    - May 2024, abdominal ultrasound showed a 2 cm lesion in the left lobe of the liver.    - 5/29/2024: MRI liver on 5/29/2024 2.2 cm LI-RADS-4 lesion in segment 4.  - 6/14/2024: Liver (?lesion) biopsy showed cirrhosis and no evidence of HCC  - 8/12/2024: TARE/Y90 to segment 4    Presented with  and two children.    She has been quite a few issues lately - the biggest issues have been brain fog and inability to keep up at work. She has had to reduce work to 20 hours per week because of this. Her bowel movements used to be once per day, but in the past several months she has had 4-6 bowel movements per day, Denies any melena or hematochezia. She also notices that she has been quite anxious lately. She is due to establish care with a mental health therapist on Friday. Her PCP was nervous to start lexapro, so she has not been started on pharmacotherapy for this yet.    No issues with abdominal distension or lower extremity edema. Patient's weight has increased by about 10lbs in the last several months since starting prednisone in the fall - despite a reported lower appetite. She does most of the  cooking lately - her  does not cook.    She is adherent to azathioprine and prednisone    Medical hx Surgical hx   Past Medical History:   Diagnosis Date    Autoimmune hepatitis (H) 02/2023    Cirrhosis (H) 06/14/2024    History of basal cell carcinoma     Hypertension     Motion sickness     Osteoarthritis     Osteopenia     PONV (postoperative nausea and vomiting)     Squamous cell carcinoma of skin of face     Type 2 diabetes mellitus (H)       Past Surgical History:   Procedure Laterality Date    CYSTOSCOPY N/A 01/14/2022    Procedure: CYSTOSCOPY;  Surgeon: Bethany Anderson DO;  Location: [facility] OR    DILATION AND CURETTAGE, OPERATIVE HYSTEROSCOPY, COMBINED N/A 11/15/2021    Procedure: HYSTEROSCOPY, WITH DILATION AND CURETTAGE OF UTERUS;  Surgeon: Ping Hutchins MD;  Location: [facility] OR    LAPAROSCOPIC HYSTERECTOMY TOTAL Bilateral 01/14/2022    Procedure: TOTAL LAPAROSCOPIC HYSTERECTOMY, BILATERAL SALPINGO-OPHORECTOMY,;  Surgeon: Bethany Anderson DO;  Location: [facility] OR    ROTATOR CUFF REPAIR RT/LT Right     TONSILLECTOMY      WISDOM TOOTH EXTRACTION            Medications  Current Outpatient Medications   Medication Sig Dispense Refill    azaTHIOprine (IMURAN) 50 MG tablet Take 1 tablet (50 mg) by mouth daily. 90 tablet 3    calcium carbonate (OS-ASH) 500 MG tablet Take 1 tablet (500 mg) by mouth 2 times daily. 90 tablet 3    carvedilol (COREG) 6.25 MG tablet Take 1 tablet (6.25 mg) by mouth 2 times daily (with meals). 90 tablet 3    lisinopril (ZESTRIL) 20 MG tablet Take 20 mg by mouth daily       metFORMIN (GLUCOPHAGE-XR) 500 MG 24 hr tablet Take 1,000 mg by mouth daily (with dinner)      multivitamin w/minerals (THERA-VIT-M) tablet Take 1 tablet by mouth daily.      omeprazole (PRILOSEC) 40 MG DR capsule Take 40 mg by mouth daily.      predniSONE (DELTASONE) 2.5 MG tablet Take 1 tablet (2.5 mg) by mouth daily. 90 tablet 3    rifaximin (XIFAXAN) 550 MG TABS  tablet Take 1 tablet (550 mg) by mouth 2 times daily. 90 tablet 3    vitamin D3 (CHOLECALCIFEROL) 50 mcg (2000 units) tablet Take 1 tablet (50 mcg) by mouth daily. 90 tablet 3         Allergies  Allergies   Allergen Reactions    Penicillins Unknown and Other (See Comments)     Annotation: unsure of reaction    Since childhood   Other reaction(s): *Unknown   Annotation: unsure of reaction        Annotation: unsure of reaction     Since childhood   Other reaction(s): *Unknown   Annotation: unsure of reaction      Annotation: unsure of reaction     Since childhood   Other reaction(s): *Unknown   Annotation: unsure of reaction       Family hx Social hx   Family History   Problem Relation Age of Onset    Coronary Artery Disease Mother     Lymphoma Mother     Diabetes Father     Kidney Cancer Father     Testicular cancer Brother     Coronary Artery Disease Maternal Grandmother     Cerebrovascular Disease Paternal Grandfather     Cirrhosis Maternal Cousin     Alcoholism Maternal Cousin     Cirrhosis Maternal Uncle     Alcoholism Maternal Uncle     Colon Cancer Maternal Uncle 34      Social History     Tobacco Use    Smoking status: Never    Smokeless tobacco: Never   Vaping Use    Vaping status: Never Used   Substance Use Topics    Alcohol use: Not Currently     Comment: Last drink 10/2022    Drug use: Never     - Employment:  [employer]  -  to Christopher  - Two children  - Alcohol: Patient does not drink alcohol.  She last drank alcohol ~2022 when she was diagnosed with autoimmune hepatitis.  Prior to this, patient would drink 1 glass of wine per day.  Patient has no history of AUD or DUIs.  - Tobacco: Never     Review of systems  A 10-point review of systems was negative.    Examination  /67 (BP Location: Left arm, Cuff Size: Adult Regular)   Pulse 52   Wt 72.2 kg (159 lb 4 oz)   SpO2 96%   BMI 27.19 kg/m     Body mass index is 27.19 kg/m .    Gen-NAD  Eye- No conjunctival icterus.   CVS- RRR, no murmurs  RS-  CTA bilaterally  Abd- Overweight, soft, non-tender, non-distended   Extr- 2+ radial pulses bilaterally, no lower extremity edema bilaterally  MS- hands without clubbing  Neuro- A+Ox3  Skin- no jaundice. Dupytren's contracture on left  Psych- normal mood    Laboratory  BMP  Recent Labs   Lab Test 01/14/25  0748 10/29/24  0709 01/14/22  1410 01/14/22  1008    140  --   --    POTASSIUM 4.1 3.8  --   --    CHLORIDE 105 104  --   --    ASH 9.3 9.2  --   --    CO2 24 27  --   --    BUN 11.0 10.9  --   --    CR 0.53 0.59  --   --    * 179* 181* 129*     CBC  Recent Labs   Lab Test 01/14/25  0748 10/29/24  0709   WBC 4.0 4.3   RBC 3.48* 3.43*   HGB 13.4 13.0   HCT 36.7 36.9   * 108*   MCH 38.5* 37.9*   MCHC 36.5 35.2   RDW 16.1* 15.2*   PLT 98* 94*     Liver Enzymes   Recent Labs   Lab Test 01/14/25  0748   PROTTOTAL 7.1   ALBUMIN 3.6   BILITOTAL 1.1   ALKPHOS 98   AST 50*   ALT 42      INR   INR   Date Value Ref Range Status   01/14/2025 1.27 (H) 0.85 - 1.15 Final       Radiology  MRI Abdomen 1/14/2025  1. Cirrhosis and portal hypertension evidenced by stable mild  splenomegaly.  2. Continued decreased size of radioembolization changes within  hepatic segment 4 without suspicious features. LR-TR nonviable.   3. Numerous stable subcentimeter arterially enhancing observations  without suspicious imaging features on further delayed sequences.  LR-3.   4. Based on this exam only, the patient is within Stanton criteria.    Endoscopy  EGD 12/4/2024  - Tortuous esophagus.                          - Grade II esophageal varices. Incompletely                          eradicated. Banded.                          - Portal hypertensive gastropathy.                          - Normal examined duodenum.                          - No specimens collected.     Colonoscopy 11/12/2024  - The examined portion of the ileum was normal.                          - Congested mucosa in the entire examined colon.                           - Stool in the entire examined colon.                          - One 6 mm polyp at the hepatic flexure, removed                          with a cold snare. Resected and retrieved. Clip was                          placed.                          - Two 2 to 3 mm polyps in the descending colon,                          removed with a jumbo cold forceps. Resected and                          retrieved.                          - One 7 mm polyp in the sigmoid colon, removed with                          a cold snare. Resected and retrieved. Clip was                          placed.                          - One 3 mm polyp in the sigmoid colon, removed with                          a jumbo cold forceps. Resected and retrieved. Clip                          was placed. Clip : [employer].                          - One 2 mm polyp in the rectum, removed with a                          jumbo cold forceps. Resected and retrieved. Clip                          was placed.                          - Non-bleeding internal hemorrhoids.                          - Excessive bleeding after polyp removal likely                          related to underlying liver disease.   Path: Tubular adenoma + Hyperplastic polyps

## 2025-01-14 ENCOUNTER — HOSPITAL ENCOUNTER (OUTPATIENT)
Dept: MRI IMAGING | Facility: CLINIC | Age: 60
Discharge: HOME | End: 2025-01-14
Attending: INTERNAL MEDICINE
Payer: COMMERCIAL

## 2025-01-14 ENCOUNTER — OFFICE VISIT (OUTPATIENT)
Dept: GASTROENTEROLOGY | Facility: CLINIC | Age: 60
End: 2025-01-14
Attending: INTERNAL MEDICINE
Payer: COMMERCIAL

## 2025-01-14 ENCOUNTER — LAB (OUTPATIENT)
Dept: LAB | Facility: CLINIC | Age: 60
End: 2025-01-14
Attending: INTERNAL MEDICINE
Payer: COMMERCIAL

## 2025-01-14 VITALS
SYSTOLIC BLOOD PRESSURE: 127 MMHG | OXYGEN SATURATION: 96 % | WEIGHT: 159.25 LBS | HEART RATE: 52 BPM | DIASTOLIC BLOOD PRESSURE: 67 MMHG | BODY MASS INDEX: 27.19 KG/M2

## 2025-01-14 DIAGNOSIS — K74.60 CIRRHOSIS OF LIVER WITHOUT ASCITES, UNSPECIFIED HEPATIC CIRRHOSIS TYPE (H): ICD-10-CM

## 2025-01-14 DIAGNOSIS — K75.4 AUTOIMMUNE HEPATITIS (H): ICD-10-CM

## 2025-01-14 DIAGNOSIS — M85.80 OSTEOPENIA, UNSPECIFIED LOCATION: ICD-10-CM

## 2025-01-14 DIAGNOSIS — K76.6 PORTAL HYPERTENSION (H): ICD-10-CM

## 2025-01-14 DIAGNOSIS — C22.0 HEPATOCELLULAR CARCINOMA (H): ICD-10-CM

## 2025-01-14 DIAGNOSIS — K76.82 HEPATIC ENCEPHALOPATHY (H): ICD-10-CM

## 2025-01-14 DIAGNOSIS — R19.5 LOOSE STOOLS: ICD-10-CM

## 2025-01-14 DIAGNOSIS — R19.5 LOOSE STOOLS: Primary | ICD-10-CM

## 2025-01-14 LAB
AFP SERPL-MCNC: 12.4 NG/ML
ALBUMIN SERPL BCG-MCNC: 3.6 G/DL (ref 3.5–5.2)
ALP SERPL-CCNC: 98 U/L (ref 40–150)
ALT SERPL W P-5'-P-CCNC: 42 U/L (ref 0–50)
ANION GAP SERPL CALCULATED.3IONS-SCNC: 9 MMOL/L (ref 7–15)
AST SERPL W P-5'-P-CCNC: 50 U/L (ref 0–45)
BILIRUB DIRECT SERPL-MCNC: 0.43 MG/DL (ref 0–0.3)
BILIRUB SERPL-MCNC: 1.1 MG/DL
BUN SERPL-MCNC: 11 MG/DL (ref 6–20)
CALCIUM SERPL-MCNC: 9.3 MG/DL (ref 8.8–10.4)
CHLORIDE SERPL-SCNC: 105 MMOL/L (ref 98–107)
CREAT SERPL-MCNC: 0.53 MG/DL (ref 0.51–0.95)
EGFRCR SERPLBLD CKD-EPI 2021: >90 ML/MIN/1.73M2
ERYTHROCYTE [DISTWIDTH] IN BLOOD BY AUTOMATED COUNT: 16.1 % (ref 10–15)
GLUCOSE SERPL-MCNC: 158 MG/DL (ref 70–99)
HCO3 SERPL-SCNC: 24 MMOL/L (ref 22–29)
HCT VFR BLD AUTO: 36.7 % (ref 35–47)
HGB BLD-MCNC: 13.4 G/DL (ref 11.7–15.7)
INR PPP: 1.27 (ref 0.85–1.15)
MCH RBC QN AUTO: 38.5 PG (ref 26.5–33)
MCHC RBC AUTO-ENTMCNC: 36.5 G/DL (ref 31.5–36.5)
MCV RBC AUTO: 106 FL (ref 78–100)
PLATELET # BLD AUTO: 98 10E3/UL (ref 150–450)
POTASSIUM SERPL-SCNC: 4.1 MMOL/L (ref 3.4–5.3)
PROT SERPL-MCNC: 7.1 G/DL (ref 6.4–8.3)
RBC # BLD AUTO: 3.48 10E6/UL (ref 3.8–5.2)
SODIUM SERPL-SCNC: 138 MMOL/L (ref 135–145)
TSH SERPL DL<=0.005 MIU/L-ACNC: 4.2 UIU/ML (ref 0.3–4.2)
WBC # BLD AUTO: 4 10E3/UL (ref 4–11)

## 2025-01-14 PROCEDURE — 82105 ALPHA-FETOPROTEIN SERUM: CPT

## 2025-01-14 PROCEDURE — 74183 MRI ABD W/O CNTR FLWD CNTR: CPT

## 2025-01-14 PROCEDURE — 99417 PROLNG OP E/M EACH 15 MIN: CPT | Performed by: INTERNAL MEDICINE

## 2025-01-14 PROCEDURE — G2211 COMPLEX E/M VISIT ADD ON: HCPCS | Performed by: INTERNAL MEDICINE

## 2025-01-14 PROCEDURE — 85018 HEMOGLOBIN: CPT

## 2025-01-14 PROCEDURE — 82374 ASSAY BLOOD CARBON DIOXIDE: CPT

## 2025-01-14 PROCEDURE — A9585 GADOBUTROL INJECTION: HCPCS | Performed by: INTERNAL MEDICINE

## 2025-01-14 PROCEDURE — 99215 OFFICE O/P EST HI 40 MIN: CPT | Performed by: INTERNAL MEDICINE

## 2025-01-14 PROCEDURE — 255N000002 HC RX 255 OP 636: Performed by: INTERNAL MEDICINE

## 2025-01-14 PROCEDURE — 84443 ASSAY THYROID STIM HORMONE: CPT

## 2025-01-14 PROCEDURE — 80048 BASIC METABOLIC PNL TOTAL CA: CPT

## 2025-01-14 PROCEDURE — 85610 PROTHROMBIN TIME: CPT

## 2025-01-14 PROCEDURE — 74183 MRI ABD W/O CNTR FLWD CNTR: CPT | Mod: 26 | Performed by: RADIOLOGY

## 2025-01-14 PROCEDURE — 99213 OFFICE O/P EST LOW 20 MIN: CPT | Performed by: INTERNAL MEDICINE

## 2025-01-14 PROCEDURE — 82248 BILIRUBIN DIRECT: CPT

## 2025-01-14 RX ORDER — PREDNISONE 2.5 MG/1
2.5 TABLET ORAL DAILY
Qty: 90 TABLET | Refills: 3 | Status: SHIPPED | OUTPATIENT
Start: 2025-01-14

## 2025-01-14 RX ORDER — CALCIUM CARBONATE 500(1250)
1 TABLET ORAL 2 TIMES DAILY
Qty: 90 TABLET | Refills: 3 | Status: SHIPPED | OUTPATIENT
Start: 2025-01-14

## 2025-01-14 RX ORDER — AZATHIOPRINE 50 MG/1
50 TABLET ORAL DAILY
Qty: 90 TABLET | Refills: 3 | Status: SHIPPED | OUTPATIENT
Start: 2025-01-14

## 2025-01-14 RX ORDER — CARVEDILOL 6.25 MG/1
6.25 TABLET ORAL 2 TIMES DAILY WITH MEALS
Qty: 90 TABLET | Refills: 3 | Status: SHIPPED | OUTPATIENT
Start: 2025-01-14

## 2025-01-14 RX ORDER — GADOBUTROL 604.72 MG/ML
7.5 INJECTION INTRAVENOUS ONCE
Status: COMPLETED | OUTPATIENT
Start: 2025-01-14 | End: 2025-01-14

## 2025-01-14 RX ORDER — CHOLECALCIFEROL (VITAMIN D3) 50 MCG
1 TABLET ORAL DAILY
Qty: 90 TABLET | Refills: 3 | Status: SHIPPED | OUTPATIENT
Start: 2025-01-14

## 2025-01-14 RX ADMIN — GADOBUTROL 7.5 ML: 604.72 INJECTION INTRAVENOUS at 08:12

## 2025-01-14 ASSESSMENT — ANXIETY QUESTIONNAIRES
GAD7 TOTAL SCORE: 12
1. FEELING NERVOUS, ANXIOUS, OR ON EDGE: SEVERAL DAYS
5. BEING SO RESTLESS THAT IT IS HARD TO SIT STILL: SEVERAL DAYS
3. WORRYING TOO MUCH ABOUT DIFFERENT THINGS: NEARLY EVERY DAY
IF YOU CHECKED OFF ANY PROBLEMS ON THIS QUESTIONNAIRE, HOW DIFFICULT HAVE THESE PROBLEMS MADE IT FOR YOU TO DO YOUR WORK, TAKE CARE OF THINGS AT HOME, OR GET ALONG WITH OTHER PEOPLE: SOMEWHAT DIFFICULT
7. FEELING AFRAID AS IF SOMETHING AWFUL MIGHT HAPPEN: SEVERAL DAYS
2. NOT BEING ABLE TO STOP OR CONTROL WORRYING: MORE THAN HALF THE DAYS
6. BECOMING EASILY ANNOYED OR IRRITABLE: MORE THAN HALF THE DAYS
4. TROUBLE RELAXING: MORE THAN HALF THE DAYS
GAD7 TOTAL SCORE: 12
7. FEELING AFRAID AS IF SOMETHING AWFUL MIGHT HAPPEN: SEVERAL DAYS
GAD7 TOTAL SCORE: 12
8. IF YOU CHECKED OFF ANY PROBLEMS, HOW DIFFICULT HAVE THESE MADE IT FOR YOU TO DO YOUR WORK, TAKE CARE OF THINGS AT HOME, OR GET ALONG WITH OTHER PEOPLE?: SOMEWHAT DIFFICULT

## 2025-01-14 ASSESSMENT — PAIN SCALES - GENERAL: PAINLEVEL_OUTOF10: NO PAIN (0)

## 2025-01-14 NOTE — NURSING NOTE
Chief Complaint   Patient presents with    RECHECK     Liver      /67 (BP Location: Left arm, Cuff Size: Adult Regular)   Pulse 52   Wt 72.2 kg (159 lb 4 oz)   SpO2 96%   BMI 27.19 kg/m    Estella Murcia CMA on 1/14/2025 at 12:02 PM

## 2025-01-14 NOTE — LETTER
1/14/2025      Ava Miranda  [address]  [facility] [address]      Dear Colleague,    Thank you for referring your patient, Ava Miranda, to the [facility] [place]. Please see a copy of my visit note below.    [facility] Hepatology    Assessment  59 year old female with PMHx of compensated AIH related cirrhosis. PMHx of type II diabetes and HTN    #. Compensated autoimmune related hepatitis cirrhosis  #. ?Hepatic Encephalopathy  MELD 3.0: 10    Patient with compensated AIH related cirrhosis. Patient with symptoms of brain fog that are interfering with her work - no asterixis on exam. Patient has 4-6 bowel movements per day, thus will defer lactulose and start rifaximin 550mg BID. No history of ascites or history of variceal bleeding. Patient is due for variceal screening in the upcoming month.     She is currently on azathioprine and prednisone for AIH management. She has mildly elevated AST and ALT, but suspect this related to her overweight status. She is on coreg given hx of varices, will increase to goal directed medical dose of 6.25mg BID.     #. 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 Given this, she is not a candidate for MELD exception points for consideration of liver transplantation.    Liver Transplantation  Discussed at this time she is a generally well compensated with a MELD of 10. She is not a candidate for MELD exception points for consideration of liver transplantation. Given this, we discussed will not continue the liver transplant evaluation. If she were to develop further decompensation or proven HCC, we would consider initiating the liver transplant evaluation at that time.     #. Osteopenia    #. Loose stools  Patient with several months of loose stools without melena or hematochezia. Colonoscopy 11/2024 with multiple polyps removed. No biopsies performed for microscopic colitis. Repeat TSH checked in  clinic - within normal limits. Unlikely celiac disease, but pending TTg IgA/IgG. Plan to increase fiber and if no improvement, will consider further workup and management.     #. Overweight, BMI > 25  Discussed lifestyle and dietary modifications for weight loss especially given she is on prednisone + a post-menopausal female.     #. Anxiety    Plan  -- Follow up Ttg IgA/IgG  -- Start rifaximin 550mg BID for possible hepatic encephalopathy    * Patient to reach out in 2 weeks; if no improvement with rifaximin, may consider further workup given concurrent mental health issues  -- Continue azathioprine 50mg daily + prednisone 2.5mg PO Q24   * IgG every 3 months   * Given increased risk of skin cancer, recommend yearly skin cancer exams (already doing every 6 months)   * CBC every 3 months while on azathioprine   -- Increase carvedilol to 6.25mg BID; if low blood pressures, can consider reducing her lisinopril  -- MRI + AFP every 3 months for 1 year since Y-90 for LR-4 lesion (not proven HCC); after that will space our surveillance to every 6 months  -- Variceal Screening next due 1/22/2025; scheduled at OSH  -- Vitamin D + calcium supplementation for osteopenia   -- Increase fiber (metamucil daily) given loose stools; encouraged with plenty of water  -- Follow up with PCP re: mental health concerns; no concern re: starting lexapro    Health Maintenance:  -- Recommend yearly influenza vaccination   -- Recommend dental visits every 6 months  -- Colon Cancer Screening: due 2027  -- Breast Cancer Screening: mammogram per PCP   -- Cervical Cancer Screening: pap smear + HPV co-testing  per PCP     Nutrition & Physical Activity:  -- Recommend low sodium (< 2 grams per day) + high protein + low carbohydrate    RTC: 3 months    Bethany Gonzalez MD (Lizzie)  Advanced & Transplant Hepatology  [facility]    I spent 90 minutes on this encounter performing the following: reviewing the patient's medical  record (clinic visits, hospital records, lab results, imaging and procedural documentation), history taking, physical exam and documentation on the date of the encounter. I also spent part of the time in coordination of care and counseling.    The longitudinal plan of care for the diagnosis(es)/condition(s) as documented were addressed during this visit. Due to the added complexity in care, I will continue to support Ava in the subsequent management and with ongoing continuity of care.    HPI:  AIH Cirrhosis  - dx ~ 5/2024  - no hx HE  - no hx ascites  - no hx variceal bleed  - last EGD 12/2024 - grade II EV s/p banding   - TPMT: normal 2023    Liver Lesion: Not confirmed HCC, LR-4  - 8/12/2024: TARE/Y90 to segment 4  - 12/6/2024 pathology review at [facility]: Chronic hepatitis with mild activity and established cirrhosis. Negative for neoplasm.     History:   - Patient was diagnosed with autoimmune hepatitis in February 2023.  At that time, liver biopsy showed stage III-IV fibrosis.  Patient was started on azathioprine and monitored clinically.    - May 2024, abdominal ultrasound showed a 2 cm lesion in the left lobe of the liver.    - 5/29/2024: MRI liver on 5/29/2024 2.2 cm LI-RADS-4 lesion in segment 4.  - 6/14/2024: Liver (?lesion) biopsy showed cirrhosis and no evidence of HCC  - 8/12/2024: TARE/Y90 to segment 4    Presented with  and two children.    She has been quite a few issues lately - the biggest issues have been brain fog and inability to keep up at work. She has had to reduce work to 20 hours per week because of this. Her bowel movements used to be once per day, but in the past several months she has had 4-6 bowel movements per day, Denies any melena or hematochezia. She also notices that she has been quite anxious lately. She is due to establish care with a mental health therapist on Friday. Her PCP was nervous to start lexapro, so she has not been started on pharmacotherapy for this yet.    No  issues with abdominal distension or lower extremity edema. Patient's weight has increased by about 10lbs in the last several months since starting prednisone in the fall - despite a reported lower appetite. She does most of the cooking lately - her  does not cook.    She is adherent to azathioprine and prednisone    Medical hx Surgical hx   Past Medical History:   Diagnosis Date     Autoimmune hepatitis (H) 02/2023     Cirrhosis (H) 06/14/2024     History of basal cell carcinoma      Hypertension      Motion sickness      Osteoarthritis      Osteopenia      PONV (postoperative nausea and vomiting)      Squamous cell carcinoma of skin of face      Type 2 diabetes mellitus (H)       Past Surgical History:   Procedure Laterality Date     CYSTOSCOPY N/A 01/14/2022    Procedure: CYSTOSCOPY;  Surgeon: Bethany Anderson DO;  Location: [facility] OR     DILATION AND CURETTAGE, OPERATIVE HYSTEROSCOPY, COMBINED N/A 11/15/2021    Procedure: HYSTEROSCOPY, WITH DILATION AND CURETTAGE OF UTERUS;  Surgeon: Ping Hutchins MD;  Location: [facility] OR     LAPAROSCOPIC HYSTERECTOMY TOTAL Bilateral 01/14/2022    Procedure: TOTAL LAPAROSCOPIC HYSTERECTOMY, BILATERAL SALPINGO-OPHORECTOMY,;  Surgeon: Bethany Anderson DO;  Location: [facility] OR     ROTATOR CUFF REPAIR RT/LT Right      TONSILLECTOMY       WISDOM TOOTH EXTRACTION            Medications  Current Outpatient Medications   Medication Sig Dispense Refill     azaTHIOprine (IMURAN) 50 MG tablet Take 1 tablet (50 mg) by mouth daily. 90 tablet 3     calcium carbonate (OS-ASH) 500 MG tablet Take 1 tablet (500 mg) by mouth 2 times daily. 90 tablet 3     carvedilol (COREG) 6.25 MG tablet Take 1 tablet (6.25 mg) by mouth 2 times daily (with meals). 90 tablet 3     lisinopril (ZESTRIL) 20 MG tablet Take 20 mg by mouth daily        metFORMIN (GLUCOPHAGE-XR) 500 MG 24 hr tablet Take 1,000 mg by mouth daily (with dinner)       multivitamin w/minerals  (THERA-VIT-M) tablet Take 1 tablet by mouth daily.       omeprazole (PRILOSEC) 40 MG DR capsule Take 40 mg by mouth daily.       predniSONE (DELTASONE) 2.5 MG tablet Take 1 tablet (2.5 mg) by mouth daily. 90 tablet 3     rifaximin (XIFAXAN) 550 MG TABS tablet Take 1 tablet (550 mg) by mouth 2 times daily. 90 tablet 3     vitamin D3 (CHOLECALCIFEROL) 50 mcg (2000 units) tablet Take 1 tablet (50 mcg) by mouth daily. 90 tablet 3         Allergies  Allergies   Allergen Reactions     Penicillins Unknown and Other (See Comments)     Annotation: unsure of reaction    Since childhood   Other reaction(s): *Unknown   Annotation: unsure of reaction        Annotation: unsure of reaction     Since childhood   Other reaction(s): *Unknown   Annotation: unsure of reaction      Annotation: unsure of reaction     Since childhood   Other reaction(s): *Unknown   Annotation: unsure of reaction       Family hx Social hx   Family History   Problem Relation Age of Onset     Coronary Artery Disease Mother      Lymphoma Mother      Diabetes Father      Kidney Cancer Father      Testicular cancer Brother      Coronary Artery Disease Maternal Grandmother      Cerebrovascular Disease Paternal Grandfather      Cirrhosis Maternal Cousin      Alcoholism Maternal Cousin      Cirrhosis Maternal Uncle      Alcoholism Maternal Uncle      Colon Cancer Maternal Uncle 34      Social History     Tobacco Use     Smoking status: Never     Smokeless tobacco: Never   Vaping Use     Vaping status: Never Used   Substance Use Topics     Alcohol use: Not Currently     Comment: Last drink 10/2022     Drug use: Never     - Employment:  [employer]  -  to Christopher  - Two children  - Alcohol: Patient does not drink alcohol.  She last drank alcohol ~2022 when she was diagnosed with autoimmune hepatitis.  Prior to this, patient would drink 1 glass of wine per day.  Patient has no history of AUD or DUIs.  - Tobacco: Never     Review of systems  A 10-point review of  systems was negative.    Examination  /67 (BP Location: Left arm, Cuff Size: Adult Regular)   Pulse 52   Wt 72.2 kg (159 lb 4 oz)   SpO2 96%   BMI 27.19 kg/m     Body mass index is 27.19 kg/m .    Gen-NAD  Eye- No conjunctival icterus.   CVS- RRR, no murmurs  RS- CTA bilaterally  Abd- Overweight, soft, non-tender, non-distended   Extr- 2+ radial pulses bilaterally, no lower extremity edema bilaterally  MS- hands without clubbing  Neuro- A+Ox3  Skin- no jaundice. Dupytren's contracture on left  Psych- normal mood    Laboratory  BMP  Recent Labs   Lab Test 01/14/25  0748 10/29/24  0709 01/14/22  1410 01/14/22  1008    140  --   --    POTASSIUM 4.1 3.8  --   --    CHLORIDE 105 104  --   --    ASH 9.3 9.2  --   --    CO2 24 27  --   --    BUN 11.0 10.9  --   --    CR 0.53 0.59  --   --    * 179* 181* 129*     CBC  Recent Labs   Lab Test 01/14/25  0748 10/29/24  0709   WBC 4.0 4.3   RBC 3.48* 3.43*   HGB 13.4 13.0   HCT 36.7 36.9   * 108*   MCH 38.5* 37.9*   MCHC 36.5 35.2   RDW 16.1* 15.2*   PLT 98* 94*     Liver Enzymes   Recent Labs   Lab Test 01/14/25  0748   PROTTOTAL 7.1   ALBUMIN 3.6   BILITOTAL 1.1   ALKPHOS 98   AST 50*   ALT 42      INR   INR   Date Value Ref Range Status   01/14/2025 1.27 (H) 0.85 - 1.15 Final       Radiology  MRI Abdomen 1/14/2025  1. Cirrhosis and portal hypertension evidenced by stable mild  splenomegaly.  2. Continued decreased size of radioembolization changes within  hepatic segment 4 without suspicious features. LR-TR nonviable.   3. Numerous stable subcentimeter arterially enhancing observations  without suspicious imaging features on further delayed sequences.  LR-3.   4. Based on this exam only, the patient is within Ruperto criteria.    Endoscopy  EGD 12/4/2024  - Tortuous esophagus.                          - Grade II esophageal varices. Incompletely                          eradicated. Banded.                          - Portal hypertensive gastropathy.                           - Normal examined duodenum.                          - No specimens collected.     Colonoscopy 11/12/2024  - The examined portion of the ileum was normal.                          - Congested mucosa in the entire examined colon.                          - Stool in the entire examined colon.                          - One 6 mm polyp at the hepatic flexure, removed                          with a cold snare. Resected and retrieved. Clip was                          placed.                          - Two 2 to 3 mm polyps in the descending colon,                          removed with a jumbo cold forceps. Resected and                          retrieved.                          - One 7 mm polyp in the sigmoid colon, removed with                          a cold snare. Resected and retrieved. Clip was                          placed.                          - One 3 mm polyp in the sigmoid colon, removed with                          a jumbo cold forceps. Resected and retrieved. Clip                          was placed. Clip : [employer].                          - One 2 mm polyp in the rectum, removed with a                          jumbo cold forceps. Resected and retrieved. Clip                          was placed.                          - Non-bleeding internal hemorrhoids.                          - Excessive bleeding after polyp removal likely                          related to underlying liver disease.   Path: Tubular adenoma + Hyperplastic polyps            Again, thank you for allowing me to participate in the care of your patient.        Sincerely,        Bethany Gonzalez MD    Electronically signed

## 2025-01-16 ENCOUNTER — TELEPHONE (OUTPATIENT)
Dept: GASTROENTEROLOGY | Facility: CLINIC | Age: 60
End: 2025-01-16
Payer: COMMERCIAL

## 2025-01-16 ASSESSMENT — PATIENT HEALTH QUESTIONNAIRE - PHQ9
10. IF YOU CHECKED OFF ANY PROBLEMS, HOW DIFFICULT HAVE THESE PROBLEMS MADE IT FOR YOU TO DO YOUR WORK, TAKE CARE OF THINGS AT HOME, OR GET ALONG WITH OTHER PEOPLE: SOMEWHAT DIFFICULT
SUM OF ALL RESPONSES TO PHQ QUESTIONS 1-9: 12
SUM OF ALL RESPONSES TO PHQ QUESTIONS 1-9: 12

## 2025-01-16 NOTE — TELEPHONE ENCOUNTER
Patient confirmed scheduled appointment:     Date: 4/8/25  Time: 11:00 am  Appointment Type: Return Liver  Provider: Dr. Bethany Gonzalez  Location: [place]  Testing/imaging: Labs & MRI  Additional Notes:

## 2025-01-17 ENCOUNTER — OFFICE VISIT (OUTPATIENT)
Dept: PSYCHOLOGY | Facility: CLINIC | Age: 60
End: 2025-01-17
Payer: COMMERCIAL

## 2025-01-17 DIAGNOSIS — F43.23 ADJUSTMENT DISORDER WITH MIXED ANXIETY AND DEPRESSED MOOD: Primary | ICD-10-CM

## 2025-01-17 NOTE — PROGRESS NOTES
Psychodiagnostic Evaluation  Health Psychology Clinic, Division of General Internal Medicine, Department of Medicine  [facility]    Patient name: Ava Miranda  MRN: [id number]    Referral requested by: Eleuterio Dove MSW   Referral reason: Assistance coping with chronic medical illness     Patient Demographics (per chart):  Age: 59 year old  Biological Sex: female  Race: White  Ethnicity: Not  or     History of Presenting Concerns:  Ms. Miranda is a 59 year oldy/o, White, female who presents to the Health Psychology clinic with PMHx of compensated AIH related cirrhosis who is interested in establishing services with health psychology clinic for assistance coping with chronic progressive illness.     Medical History (per chart):  Patient Active Problem List   Diagnosis    Generalized Osteoarthritis Of The Hand    Cirrhosis of liver without ascites (H)    Esophageal varices without bleeding (H)    Type 2 diabetes mellitus without complication, without long-term current use of insulin (H)    Autoimmune hepatitis (H)      Past Medical History:   Diagnosis Date    Autoimmune hepatitis (H) 02/2023    Cirrhosis (H) 06/14/2024    History of basal cell carcinoma     Hypertension     Motion sickness     Osteoarthritis     Osteopenia     PONV (postoperative nausea and vomiting)     Squamous cell carcinoma of skin of face     Type 2 diabetes mellitus (H)      Past Surgical History:   Procedure Laterality Date    CYSTOSCOPY N/A 01/14/2022    DILATION AND CURETTAGE, OPERATIVE HYSTEROSCOPY, COMBINED N/A 11/15/2021    LAPAROSCOPIC HYSTERECTOMY TOTAL Bilateral 01/14/2022    ROTATOR CUFF REPAIR RT/LT Right     TONSILLECTOMY      WISDOM TOOTH EXTRACTION       Current Outpatient Medications   Medication Sig Dispense Refill    azaTHIOprine (IMURAN) 50 MG tablet Take 1 tablet (50 mg) by mouth daily. 90 tablet 3    calcium carbonate (OS-ASH) 500 MG tablet Take 1 tablet (500  "mg) by mouth 2 times daily. 90 tablet 3    carvedilol (COREG) 6.25 MG tablet Take 1 tablet (6.25 mg) by mouth 2 times daily (with meals). 90 tablet 3    lisinopril (ZESTRIL) 20 MG tablet Take 20 mg by mouth daily       metFORMIN (GLUCOPHAGE-XR) 500 MG 24 hr tablet Take 1,000 mg by mouth daily (with dinner)      multivitamin w/minerals (THERA-VIT-M) tablet Take 1 tablet by mouth daily.      omeprazole (PRILOSEC) 40 MG DR capsule Take 40 mg by mouth daily.      predniSONE (DELTASONE) 2.5 MG tablet Take 1 tablet (2.5 mg) by mouth daily. 90 tablet 3    rifaximin (XIFAXAN) 550 MG TABS tablet Take 1 tablet (550 mg) by mouth 2 times daily. 90 tablet 3    vitamin D3 (CHOLECALCIFEROL) 50 mcg (2000 units) tablet Take 1 tablet (50 mcg) by mouth daily. 90 tablet 3     No current facility-administered medications for this visit.     Wt Readings from Last 4 Encounters:   01/14/25 72.2 kg (159 lb 4 oz)   10/29/24 71.4 kg (157 lb 4.8 oz)   10/29/24 71.4 kg (157 lb 4.8 oz)   09/30/24 68 kg (150 lb)      Estimated body mass index is 27.19 kg/m  as calculated from the following:    Height as of 10/29/24: 1.63 m (5' 4.17\").    Weight as of 1/14/25: 72.2 kg (159 lb 4 oz).     Social History:   Patient lives in [place], [place] in a single family home with her . She has 2 adult stepchildren. 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 Pt reported being [employer].     Health Behaviors:  Caffeine: Pt reported daily caffeine use.   Alcohol: Pt denied alcohol use. Pt denied a hx of alcohol abuse.   Tobacco/Nicotine: Pt denied tobacco use/nicotine use.   Marijuana: Pt denied marijuana use.   Illicit substances: Pt denied illicit substance use.   Physical activity/exercise: Pt denied regular physical activity or exercise.   Sleep: Pt reported sleep related concerns.   Medication adherence: Pt denied concerns related to medication adherence.     Psychiatric History:  Pt reported ongoing sxs of depression and anxiety related to " "her medical illness and daily limitations. She grossly denied a hx of mood sxs prior to current outside of reaction to routine life stressors. Pt denied sxs of moe, psychosis, panic/agoraphobia, PTSD, eating disorder, ADHD, ASD, or OCD. Pt reported a hx of \"some\" counseling and psychotherapy, but nothing at current. Pt denied a history of, or current, substance use treatment, or psychiatric hospitalization.     Assessment:  Depression Symptoms (Patient Health Questionnaire 9; PHQ-9)  The PHQ-9 is a measure of depressive symptoms.  Scores on this measure range from 0 to 27 with higher scores reflecting greater levels and frequency of depressive symptoms. Typical symptom ranges include: 0-4 minimal, 5-9 mild, 10-14 moderate, 15-19 moderately severe, 20-27 severe.       1/16/2025     9:47 AM   PHQ-9 SCORE   PHQ-9 Total Score MyChart 12 (Moderate depression)   PHQ-9 Total Score 12        Patient-reported     Anxiety Symptoms (Generalized Anxiety Disorder 7; SHANEL-7)  The SHANEL-7 is a measure of anxiety and panic symptoms.  Scores on this measure range from 0 to 21 with higher scores reflecting greater levels and frequency of anxiety symptoms. Typical symptom ranges include: 0-4 minimal, 5-9 mild, 10-14 moderate, 15-21 severe.       1/14/2025     9:23 AM   SHANEL-7 SCORE   Total Score 12 (moderate anxiety)   Total Score 12        Patient-reported     Alcohol and Drug Abuse (CAGE - Adapted to Include Drugs; CAGE-AID)  The CAGE-AID is a screening tool to assess for symptoms of alcohol or drug abuse or dependence. Scores on this measure range from 0 to 4, with higher scores reflecting experiences consistent with problem use.  CAGE-AID score  > 1 is a positive screen, suggesting further discussion is needed to determine if evaluation for alcohol or substance abuse is appropriate.  A score > 2 is considered clinically significant, suggesting further evaluation of alcohol or substance-related problems is indicated.      1/14/2025 "     9:26 AM   CAGE-AID Total Score   Total Score 0    Total Score MyChart 0 (A total score of 2 or greater is considered clinically significant)       Patient-reported     Global Health (Patient-Reported Outcomes Measurement Information System; PROMIS-10)  The PROMIS-10 is a measure of global physical and mental health. Total scores on this measure range from 8 to 40, with higher scores reflecting greater levels of perceived health.       1/14/2025     9:26 AM   PROMIS-10 Scores Only   Global Mental Health Score 10    Global Physical Health Score 12    PROMIS TOTAL - SUBSCORES 22        Patient-reported     Suicidality ([place] Suicide Severity Rating Scale Screener Past Month)      1/14/2022     9:30 AM   [place] Suicide Severity Rating (Short Version)   Over the past 2 weeks have you felt down, depressed, or hopeless? no   Over the past 2 weeks have you had thoughts of killing yourself? no   Have you ever attempted to kill yourself? no     Mental Status/Interview:  Appearance:   Appropriate   Eye Contact:   Good   Psychomotor Behavior:  Normal   Attitude:   Cooperative   Orientation:   All  Speech    Normal       Normal   Mood:    Euthymic  Affect:    Appropriate   Thought Content:   Clear  Thought Form:   Coherent  Logical     Insight:    Did not formally assess at this time.       Suicidal ideation: Pt denied active suicidal ideation.   Homicidal ideation: Pt denied active homicidal ideation.    Impression:  Based on this interview and results from the assessments administered on this date, Ms. Miranda appears to be experiencing symptoms of depression and anxiety that appear to be related to current medical illness. Symptom type, frequency, and intensity indicate a diagnosis of adjustment disorder with mixed anxiety and depression.  This patient has the following strengths: help seeking. Patient engages in the following healthy behaviors: abstinence from substances . Patient would likely benefit from CBT for  coping with illness.    Recommendations/Plan:  Return for therapy sessions.     Diagnosis:  Adjustment disorder with mixed anxiety and depressed mood (ICD-10: F43.20)    Date of Service:  01/17/25    Session Length:  Start Time: 10:00am  End Time: 10:50am    Modality: Service Information:  Type of service:   In person psychotherapy  Patient/Provider location:  [facility]   Patient telephone number: [phone]    Confidential Summary of Health Psychology Evaluation:  The patient was provided information about Health Psychology Services, including billing and limits to confidentiality and documentation in electronic medical records. Provided opportunity to ask questions and obtained verbal consent for treatment.    Meño Hood, PhD,   Clinical Health Psychologist  Phone: [phone]    *This note was completed at least in part using Dragon voice recognition software. Although reviewed after completion, some word and grammatical errors may occur.  *In accordance with the Rules of the [place] Board of [employer], it is noted that psychological descriptions and scientific procedures underlying psychological evaluations have limitations. Absolute predictions cannot be made based on information in this report.

## 2025-01-21 ENCOUNTER — ANCILLARY PROCEDURE (OUTPATIENT)
Dept: MAMMOGRAPHY | Facility: CLINIC | Age: 60
End: 2025-01-21
Attending: NURSE PRACTITIONER
Payer: COMMERCIAL

## 2025-01-21 DIAGNOSIS — Z12.31 VISIT FOR SCREENING MAMMOGRAM: ICD-10-CM

## 2025-01-21 PROCEDURE — 77063 BREAST TOMOSYNTHESIS BI: CPT

## 2025-03-15 ENCOUNTER — HEALTH MAINTENANCE LETTER (OUTPATIENT)
Age: 60
End: 2025-03-15

## 2025-03-15 ASSESSMENT — ANXIETY QUESTIONNAIRES
GAD7 TOTAL SCORE: 3
5. BEING SO RESTLESS THAT IT IS HARD TO SIT STILL: SEVERAL DAYS
7. FEELING AFRAID AS IF SOMETHING AWFUL MIGHT HAPPEN: NOT AT ALL
2. NOT BEING ABLE TO STOP OR CONTROL WORRYING: NOT AT ALL
IF YOU CHECKED OFF ANY PROBLEMS ON THIS QUESTIONNAIRE, HOW DIFFICULT HAVE THESE PROBLEMS MADE IT FOR YOU TO DO YOUR WORK, TAKE CARE OF THINGS AT HOME, OR GET ALONG WITH OTHER PEOPLE: NOT DIFFICULT AT ALL
4. TROUBLE RELAXING: SEVERAL DAYS
6. BECOMING EASILY ANNOYED OR IRRITABLE: NOT AT ALL
7. FEELING AFRAID AS IF SOMETHING AWFUL MIGHT HAPPEN: NOT AT ALL
1. FEELING NERVOUS, ANXIOUS, OR ON EDGE: NOT AT ALL
GAD7 TOTAL SCORE: 3
3. WORRYING TOO MUCH ABOUT DIFFERENT THINGS: SEVERAL DAYS
GAD7 TOTAL SCORE: 3
8. IF YOU CHECKED OFF ANY PROBLEMS, HOW DIFFICULT HAVE THESE MADE IT FOR YOU TO DO YOUR WORK, TAKE CARE OF THINGS AT HOME, OR GET ALONG WITH OTHER PEOPLE?: NOT DIFFICULT AT ALL

## 2025-03-18 ENCOUNTER — ANCILLARY PROCEDURE (OUTPATIENT)
Dept: MAMMOGRAPHY | Facility: CLINIC | Age: 60
End: 2025-03-18
Attending: NURSE PRACTITIONER
Payer: COMMERCIAL

## 2025-03-18 DIAGNOSIS — R92.8 ABNORMAL MAMMOGRAM: ICD-10-CM

## 2025-03-18 PROCEDURE — 77065 DX MAMMO INCL CAD UNI: CPT | Mod: LT

## 2025-03-18 NOTE — PROGRESS NOTES
Per [facility] Radiologist, Dr. Tavares, I have assisted the patient with scheduling the following:      Left Breast Stereotactic Guided Core Needle Biopsy  3/25/25 @ 1:00   [address], [address]   [place] [address]  [phone]    Reviewed pre and post biopsy instructions with patient and sent copies home with her. She has my direct number if she has further questions.  Patient denies further questions, verbalizes understanding and agrees with this plan.        Chary Nelson, RN, BSN, PHN, CBCN  Breast Center Imaging Nurse Coordinator   [address]  [place] [address]  [phone]

## 2025-03-20 ENCOUNTER — VIRTUAL VISIT (OUTPATIENT)
Dept: PSYCHOLOGY | Facility: CLINIC | Age: 60
End: 2025-03-20
Payer: COMMERCIAL

## 2025-03-20 DIAGNOSIS — F43.23 ADJUSTMENT DISORDER WITH MIXED ANXIETY AND DEPRESSED MOOD: Primary | ICD-10-CM

## 2025-03-20 PROCEDURE — 90834 PSYTX W PT 45 MINUTES: CPT | Mod: 95 | Performed by: COUNSELOR

## 2025-03-20 NOTE — PROGRESS NOTES
Health Psychology Outpatient Follow Up  [facility]     Patient: Ava Miranda   Date of Service: 03/23/25  Treatment Plan Due: 1/29/25    Diagnosis:  Adjustment disorder with mixed anxiety and depressed mood (ICD-10: F43.20)    Patient Demographics (per chart):   Age: 59 year old  Biological Sex: female  Race: White  Ethnicity: Not  or     Subjective:  Engaged pt in psychotherapy (CBT) for coping with chronic illness. Reviewed updates to physical and emotional health since previous session and progress with treatment plan goals. Patient reported that she has been doing well overall since last appointment citing taking a recent vacation and beginning her short term medical leave from work. Engaged patient in supportive listening and cognitive processing of ongoing sxs and stressors. Pt reported that she has been successful in relaxing during her time off, though now that she is back from vacation she is feeling guilt and stress about work. Engaged pt in psychoeducation and skills training related to cognitive restructuring/processing of emotions along with discussing ways to reframe her expectations related to work and experimenting with setting new work related boundaries to aid with chronic fatigue.     Objective   Appearance:   Appropriate   Eye Contact:   Good   Psychomotor Behavior:  Normal   Attitude:   Cooperative   Orientation:   All  Speech   Rate / Production: Normal    Volume:  Normal   Mood:    Euthymic  Affect:    Appropriate   Thought Content:   Clear  Thought Form:   Coherent  Logical     Insight:    Did not formally assess at this time.   Safety:    No safety concerns at this time.     Weight (per chart):  Wt Readings from Last 4 Encounters:   01/14/25 72.2 kg (159 lb 4 oz)   10/29/24 71.4 kg (157 lb 4.8 oz)   10/29/24 71.4 kg (157 lb 4.8 oz)   09/30/24 68 kg (150 lb)        BMI (per chart):  Estimated body mass index is 27.19 kg/m  as  "calculated from the following:    Height as of 10/29/24: 1.63 m (5' 4.17\").    Weight as of 1/14/25: 72.2 kg (159 lb 4 oz).     Depression Symptoms (Patient Health Questionnaire 9; PHQ-9)  The PHQ-9 is a measure of depressive symptoms.  Scores on this measure range from 0 to 27 with higher scores reflecting greater levels and frequency of depressive symptoms. Typical symptom ranges include: 0-4 minimal, 5-9 mild, 10-14 moderate, 15-19 moderately severe, 20-27 severe.       1/16/2025     9:47 AM   PHQ-9 SCORE   PHQ-9 Total Score MyChart 12 (Moderate depression)   PHQ-9 Total Score 12        Patient-reported     Anxiety Symptoms (Generalized Anxiety Disorder 7; SHANEL-7)  The SHANEL-7 is a measure of anxiety and panic symptoms.  Scores on this measure range from 0 to 21 with higher scores reflecting greater levels and frequency of anxiety symptoms. Typical symptom ranges include: 0-4 minimal, 5-9 mild, 10-14 moderate, 15-21 severe.       1/14/2025     9:23 AM 1/26/2025    12:30 PM 3/15/2025     1:12 PM   SHANEL-7 SCORE   Total Score 12 (moderate anxiety) 12 (moderate anxiety) 3 (minimal anxiety)   Total Score 12  12  3        Patient-reported     Alcohol and Drug Abuse (CAGE - Adapted to Include Drugs; CAGE-AID)  The CAGE-AID is a screening tool to assess for symptoms of alcohol or drug abuse or dependence. Scores on this measure range from 0 to 4, with higher scores reflecting experiences consistent with problem use.  CAGE-AID score  > 1 is a positive screen, suggesting further discussion is needed to determine if evaluation for alcohol or substance abuse is appropriate.  A score > 2 is considered clinically significant, suggesting further evaluation of alcohol or substance-related problems is indicated.      1/14/2025     9:26 AM   CAGE-AID Total Score   Total Score 0    Total Score MyChart 0 (A total score of 2 or greater is considered clinically significant)       Patient-reported     Global Health (Patient-Reported " Outcomes Measurement Information System; PROMIS-10)  The PROMIS-10 is a measure of global physical and mental health. Total scores on this measure range from 8 to 40, with higher scores reflecting greater levels of perceived health.       1/14/2025     9:26 AM   PROMIS-10 Scores Only   Global Mental Health Score 10    Global Physical Health Score 12    PROMIS TOTAL - SUBSCORES 22        Patient-reported     Suicidality ([place] Suicide Severity Rating Scale Screener Past Month)      1/14/2022     9:30 AM   [place] Suicide Severity Rating (Short Version)   Over the past 2 weeks have you felt down, depressed, or hopeless? no   Over the past 2 weeks have you had thoughts of killing yourself? no   Have you ever attempted to kill yourself? no       Assessment:  Patient was engaged in treatment. Patient appears to be experiencing depression symptoms in the moderate range and anxiety symptoms in the minimal range. Currently, the patient appears to be coping with some success. Progress towards treatment goals: ongoing.     Plan:  Return for therapy sessions.     Session Length:  Start Time: 10:00am  End Time: 10:41am    Modality: Telemedicine Information:  Type of service:   Telemedicine psychotherapy  Patient location:   Patient home in [place]    Patient telephone number: [phone]  Provider location:  At home office in [place]  Mode of Communication:   Video Conference via [employer]   Patient consent to telemedicine services? Yes  It has been determined that telemedicine service is appropriate and effective for this patient.   The patient has been notified that: Video visits will be conducted via a call with their psychologist to provide the care they need with a video conversation. Video visits may be billed at different rates depending on insurance coverage.  Patients are advised to contact their insurance provider with any questions about their health insurance coverage. If during the course of a call the  psychologist feels a video visit is not appropriate, patients will not be charged for this service.    Meño Hood, PhD,   Clinical Health Psychologist  Phone: [phone]    *This note was completed using Dragon voice recognition software. Although reviewed after completion, some word and grammatical errors may occur.

## 2025-03-25 ENCOUNTER — ANCILLARY PROCEDURE (OUTPATIENT)
Dept: MAMMOGRAPHY | Facility: CLINIC | Age: 60
End: 2025-03-25
Attending: NURSE PRACTITIONER
Payer: COMMERCIAL

## 2025-03-25 DIAGNOSIS — R92.8 ABNORMAL MAMMOGRAM: ICD-10-CM

## 2025-03-25 PROCEDURE — 250N000009 HC RX 250: Performed by: RADIOLOGY

## 2025-03-25 PROCEDURE — 272N000715 MA STEREOTACTIC BREAST BIOPSY VACUUM LT

## 2025-03-25 PROCEDURE — 88305 TISSUE EXAM BY PATHOLOGIST: CPT | Mod: TC

## 2025-03-25 PROCEDURE — A4648 IMPLANTABLE TISSUE MARKER: HCPCS

## 2025-03-25 PROCEDURE — 88305 TISSUE EXAM BY PATHOLOGIST: CPT | Mod: 26 | Performed by: PATHOLOGY

## 2025-03-25 PROCEDURE — 250N000011 HC RX IP 250 OP 636: Performed by: RADIOLOGY

## 2025-03-25 RX ORDER — LIDOCAINE HYDROCHLORIDE AND EPINEPHRINE 10; 10 MG/ML; UG/ML
10 INJECTION, SOLUTION INFILTRATION; PERINEURAL ONCE
Status: COMPLETED | OUTPATIENT
Start: 2025-03-25 | End: 2025-03-25

## 2025-03-25 RX ADMIN — LIDOCAINE HYDROCHLORIDE 10 ML: 10 INJECTION, SOLUTION INFILTRATION; PERINEURAL at 13:31

## 2025-03-25 RX ADMIN — LIDOCAINE HYDROCHLORIDE,EPINEPHRINE BITARTRATE 10 ML: 10; .01 INJECTION, SOLUTION INFILTRATION; PERINEURAL at 13:32

## 2025-03-25 NOTE — DISCHARGE INSTRUCTIONS
After Your Breast Biopsy    Bleeding, bruising, and pain  Breast tenderness and some bruising is normal and may last several days. You may wear your bra overnight to support the breast.  You may use an ice pack for pain. Place it over the area for 15 to 20 minutes, several times a day.  You may take over-the-counter pain medicine:  On the day of the biopsy, we recommend Tylenol (acetaminophen) because it does not raise your risk of bleeding.  The next day, you may take an anti-inflammatory medicine (aspirin, ibuprofen, Motrin, Aleve, Advil), unless your doctor tells you not to.  Bandages and showering  Keep your bandage in place until tomorrow morning. Don't get it wet.  If you have small pieces of tape on the skin, leave them in place. They will fall off on their own, or you can remove them after 5 days.  You may shower the next morning after your biopsy.  Activity  No heavy activity (no running, no gym workouts, no lifting, no vacuuming, etc.) on the day of your biopsy.  You may go back to normal activity the next day. But limit what you do if you still have pain or discomfort.  Infection  Infection is rare. Signs of infection include:  Fever (including sweats and chills)  Redness  Pain that gets worse  Fluid draining from the biopsy site  Biopsy results  Results may take up to 5 business days.  A nurse or doctor from the Breast Center will call with your results. The system sends the results to the doctor that ordered your biopsy & MyChart automatically.     If you have not gotten your results in 5 days, please call the Breast Center.  Call the Breast Center with questions or if:   You have bleeding that lasts more than 20 minutes.  You have pain that you can't control.  You have signs of infection (fever, sweats, chills, redness, increasing pain, or drainage).  After hours, please call the doctor who ordered your biopsy.     Breast Center Nurse  [phone]    For informational purposes only. Not to replace the  advice of your health care provider. Copyright   2010 [employer] [facility]. All rights reserved. Clinically reviewed by Camille Lai, Director, [facility] Breast Imaging. [employer] [id number] - REV 08/23.

## 2025-03-26 LAB
PATH REPORT.COMMENTS IMP SPEC: NORMAL
PATH REPORT.FINAL DX SPEC: NORMAL
PATH REPORT.GROSS SPEC: NORMAL
PATH REPORT.MICROSCOPIC SPEC OTHER STN: NORMAL
PATH REPORT.RELEVANT HX SPEC: NORMAL
PHOTO IMAGE: NORMAL

## 2025-04-03 NOTE — PROGRESS NOTES
[facility] Hepatology    Assessment  59 year old female with PMHx of compensated AIH related cirrhosis. PMHx of type II diabetes and HTN    #. Compensated autoimmune related hepatitis cirrhosis  MELD 3.0: 10    Patient with compensated AIH related cirrhosis.      Patient with no history of variceal bleeding.  She is up-to-date on variceal screening and she is currently on goal-directed medical therapy for portal hypertension.  For repeat upper endoscopy in January 2026 does not demonstrate any ongoing large varices could consider stopping EGDs and continuing carvedilol.    She is currently on azathioprine and prednisone for AIH management. She has mildly elevated AST and ALT in the setting of her overweight status.  I do not think that the prednisone is doing much for her given she has cirrhosis thus we will plan to slowly wean off her prednisone.     #. 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 Given this, she is not a candidate for MELD exception points for consideration of liver transplantation.  MRI today with several less than 1 cm areas / LR-3    Plan  -- Continue azathioprine 150mg daily   -- Slowly wean prednisone: 1 mg x 2 weeks and then off.  During this tapering phase plan for hepatic panel every 2 weeks x 3 occasions   * IgG every 3 months   * Given increased risk of skin cancer, recommend yearly skin cancer exams    * CBC every 3 months while on azathioprine   -- Carvedilol 6.25mg BID  -- MRI plus alpha-fetoprotein in 6 months given LR-3s all < 1cm on today's exam   -- Variceal Screening next due 3/2026 -if no significant varices noted on this exam and able to tolerate ongoing carvedilol 6.25 mg twice daily then can stop variceal screening  -- Vitamin D + calcium supplementation for osteopenia     Health Maintenance/Other  -- Recommend yearly influenza vaccination   -- Recommend dental visits every 6 months  -- Colon Cancer Screening: due  2027  -- Recommend low sodium (< 2 grams per day) + high protein + low carbohydrate    RTC: 6 months    Bethany Gonzalez MD (Lizzie)  Advanced & Transplant Hepatology  [facility]    I spent 40 minutes on this encounter performing the following: reviewing the patient's medical record (clinic visits, hospital records, lab results, imaging and procedural documentation), history taking, physical exam and documentation on the date of the encounter. I also spent part of the time in coordination of care and counseling.    The longitudinal plan of care for the diagnosis(es)/condition(s) as documented were addressed during this visit. Due to the added complexity in care, I will continue to support Ava in the subsequent management and with ongoing continuity of care.    HPI:  AIH Cirrhosis  - dx ~ 5/2024  - no hx HE  - no hx ascites  - no hx variceal bleed  - last EGD 3/2025: grade I EV  - TPMT: normal 2023    Liver Lesion: Not confirmed HCC, LR-4  - 8/12/2024: TARE/Y90 to segment 4  - 12/6/2024 pathology review at [facility]: Chronic hepatitis with mild activity and established cirrhosis. Negative for neoplasm.     History:   - Patient was diagnosed with autoimmune hepatitis in February 2023.  At that time, liver biopsy showed stage III-IV fibrosis.  Patient was started on azathioprine and monitored clinically.    - May 2024, abdominal ultrasound showed a 2 cm lesion in the left lobe of the liver.    - 5/29/2024: MRI liver on 5/29/2024 2.2 cm LI-RADS-4 lesion in segment 4.  - 6/14/2024: Liver (?lesion) biopsy showed cirrhosis and no evidence of HCC  - 8/12/2024: TARE/Y90 to segment 4    Patient presented with her .    She reports doing generally well.  She is currently on leave from work.  She was able to go out to the [place] for about a month and was able to disconnect from her phone which felt good.  She has connected with a psychotherapist [facility] [employer] and feels good about that  relationship.  Her biggest dresser at this time seems to be work.    She has ongoing issues with sleep.  She has not ever been able to sleep normally.  She sometimes will have difficulty falling asleep but most of the time she will wake up after 4 hours and then not be able to go back to sleep.  She used to take Benadryl for sleep but has stopped taking any supplements or medications over-the-counter because of her liver disease.    She thinks she has gained 20 pounds over the last couple of years.  She restarted weight watchers 1 week ago.  She thinks she is down 7 pounds already.  Her goal is 135 pounds.    She is adherent with her azathioprine 150 mg/day and prednisone 2.5 mg/day    Never started rifaximin due to high cost. No concerning features for HE.     Medical hx Surgical hx   Past Medical History:   Diagnosis Date    Autoimmune hepatitis (H) 02/2023    Cirrhosis (H) 06/14/2024    History of basal cell carcinoma     Hypertension     Motion sickness     Osteoarthritis     Osteopenia     PONV (postoperative nausea and vomiting)     Squamous cell carcinoma of skin of face     Type 2 diabetes mellitus (H)       Past Surgical History:   Procedure Laterality Date    CYSTOSCOPY N/A 01/14/2022    Procedure: CYSTOSCOPY;  Surgeon: Bethany Anderson DO;  Location: [facility] OR    DILATION AND CURETTAGE, OPERATIVE HYSTEROSCOPY, COMBINED N/A 11/15/2021    Procedure: HYSTEROSCOPY, WITH DILATION AND CURETTAGE OF UTERUS;  Surgeon: Ping Hutchins MD;  Location: [facility] OR    LAPAROSCOPIC HYSTERECTOMY TOTAL Bilateral 01/14/2022    Procedure: TOTAL LAPAROSCOPIC HYSTERECTOMY, BILATERAL SALPINGO-OPHORECTOMY,;  Surgeon: Bethany Anderson DO;  Location: [facility] OR    ROTATOR CUFF REPAIR RT/LT Right     TONSILLECTOMY      WISDOM TOOTH EXTRACTION            Medications  Current Outpatient Medications   Medication Sig Dispense Refill    azaTHIOprine (IMURAN) 50 MG tablet Take 3 tablets (150 mg)  by mouth daily. 90 tablet 3    calcium carbonate (OS-ASH) 500 MG tablet Take 1 tablet (500 mg) by mouth 2 times daily. 90 tablet 3    carvedilol (COREG) 6.25 MG tablet Take 1 tablet (6.25 mg) by mouth 2 times daily (with meals). 90 tablet 3    lisinopril (ZESTRIL) 20 MG tablet Take 20 mg by mouth daily       metFORMIN (GLUCOPHAGE-XR) 500 MG 24 hr tablet Take 2,000 mg by mouth daily (with dinner).      multivitamin w/minerals (THERA-VIT-M) tablet Take 1 tablet by mouth daily.      omeprazole (PRILOSEC) 40 MG DR capsule Take 40 mg by mouth daily.      predniSONE (DELTASONE) 1 MG tablet Take 1 tablet (1 mg) by mouth daily for 14 days. 14 tablet 0    vitamin D3 (CHOLECALCIFEROL) 50 mcg (2000 units) tablet Take 1 tablet (50 mcg) by mouth daily. 90 tablet 3         Allergies  Allergies   Allergen Reactions    Latex Hives    Penicillins Unknown and Other (See Comments)     Annotation: unsure of reaction    Since childhood   Other reaction(s): *Unknown   Annotation: unsure of reaction        Annotation: unsure of reaction     Since childhood   Other reaction(s): *Unknown   Annotation: unsure of reaction      Annotation: unsure of reaction     Since childhood   Other reaction(s): *Unknown   Annotation: unsure of reaction       Family hx Social hx   Family History   Problem Relation Age of Onset    Coronary Artery Disease Mother     Lymphoma Mother     Diabetes Father     Kidney Cancer Father     Testicular cancer Brother     Coronary Artery Disease Maternal Grandmother     Cerebrovascular Disease Paternal Grandfather     Cirrhosis Maternal Cousin     Alcoholism Maternal Cousin     Cirrhosis Maternal Uncle     Alcoholism Maternal Uncle     Colon Cancer Maternal Uncle 34      Social History     Tobacco Use    Smoking status: Never    Smokeless tobacco: Never   Vaping Use    Vaping status: Never Used   Substance Use Topics    Alcohol use: Not Currently     Comment: Last drink 10/2022    Drug use: Never     - Employment:  [employer]  -  to Christopher  - Two children  - Alcohol: Patient does not drink alcohol.  She last drank alcohol ~2022 when she was diagnosed with autoimmune hepatitis.  Prior to this, patient would drink 1 glass of wine per day.  Patient has no history of AUD or DUIs.  - Tobacco: Never     Review of systems  A 10-point review of systems was negative.    Examination  /73 (BP Location: Right arm, Patient Position: Chair, Cuff Size: Adult Regular)   Pulse 60   Temp 98  F (36.7  C) (Oral)   Resp 16   Wt 70.8 kg (156 lb 1.6 oz)   SpO2 98%   BMI 26.65 kg/m     Body mass index is 26.65 kg/m .    Gen-NAD  Eye- No conjunctival icterus.   CVS- RRR, no murmurs  RS- CTA bilaterally  Abd- Overweight, soft, non-tender, non-distended   Extr- 2+ radial pulses bilaterally, no lower extremity edema bilaterally  MS- hands without clubbing  Neuro- A+Ox3  Skin- no jaundice. Dupytren's contracture on left  Psych- normal mood    Laboratory  BMP  Recent Labs   Lab Test 01/14/25  0748 10/29/24  0709 01/14/22  1410 01/14/22  1008    140  --   --    POTASSIUM 4.1 3.8  --   --    CHLORIDE 105 104  --   --    ASH 9.3 9.2  --   --    CO2 24 27  --   --    BUN 11.0 10.9  --   --    CR 0.53 0.59  --   --    * 179* 181* 129*     CBC  Recent Labs   Lab Test 04/08/25  0628 01/14/25  0748 10/29/24  0709   WBC 3.8* 4.0 4.3   RBC 3.55* 3.48* 3.43*   HGB 13.4 13.4 13.0   HCT 38.0 36.7 36.9   * 106* 108*   MCH 37.7* 38.5* 37.9*   MCHC 35.3 36.5 35.2   RDW 15.7* 16.1* 15.2*   PLT 79* 98* 94*     Liver Enzymes   Recent Labs   Lab Test 04/08/25 0628   PROTTOTAL 7.4   ALBUMIN 3.8   BILITOTAL 1.5*   ALKPHOS 86   AST 47*   ALT 43      INR   INR   Date Value Ref Range Status   04/08/2025 1.36 (H) 0.85 - 1.15 Final         Radiology  MRI Abdomen 4/8/2025  1. Cirrhosis and evidence of portal hypertension.  2. Continued decreased size of embolization changes within the hepatic  segment 4 without evidence of local recurrence. LR-TR  nonviable.  3. No significant change in numerous subcentimeter arterial enhancing  foci throughout the liver without signal abnormalities on additional  sequences.LR-3.  4. Based on this exam only, the patient is within [place] criteria.  5. Mild nonspecific gallbladder wall thickening. No cholelithiasis.    Endoscopy  EGD 3/19/2025  - Grade I esophageal varices.                          - Scar in the mid esophagus and in the distal                          esophagus.                          - Z-line regular, 36 cm from the incisors.                          - Portal hypertensive gastropathy.                          - Normal examined duodenum.                          - No specimens collected.       Colonoscopy 11/12/2024  - The examined portion of the ileum was normal.                          - Congested mucosa in the entire examined colon.                          - Stool in the entire examined colon.                          - One 6 mm polyp at the hepatic flexure, removed                          with a cold snare. Resected and retrieved. Clip was                          placed.                          - Two 2 to 3 mm polyps in the descending colon,                          removed with a jumbo cold forceps. Resected and                          retrieved.                          - One 7 mm polyp in the sigmoid colon, removed with                          a cold snare. Resected and retrieved. Clip was                          placed.                          - One 3 mm polyp in the sigmoid colon, removed with                          a jumbo cold forceps. Resected and retrieved. Clip                          was placed. Clip : FAST FELT.                          - One 2 mm polyp in the rectum, removed with a                          jumbo cold forceps. Resected and retrieved. Clip                          was placed.                          - Non-bleeding internal hemorrhoids.                           - Excessive bleeding after polyp removal likely                          related to underlying liver disease.   Path: Tubular adenoma + Hyperplastic polyps

## 2025-04-08 ENCOUNTER — ANCILLARY PROCEDURE (OUTPATIENT)
Dept: MRI IMAGING | Facility: CLINIC | Age: 60
End: 2025-04-08
Attending: INTERNAL MEDICINE
Payer: COMMERCIAL

## 2025-04-08 ENCOUNTER — OFFICE VISIT (OUTPATIENT)
Dept: GASTROENTEROLOGY | Facility: CLINIC | Age: 60
End: 2025-04-08
Attending: INTERNAL MEDICINE
Payer: COMMERCIAL

## 2025-04-08 ENCOUNTER — LAB (OUTPATIENT)
Dept: LAB | Facility: CLINIC | Age: 60
End: 2025-04-08
Attending: INTERNAL MEDICINE
Payer: COMMERCIAL

## 2025-04-08 VITALS
HEART RATE: 60 BPM | DIASTOLIC BLOOD PRESSURE: 73 MMHG | SYSTOLIC BLOOD PRESSURE: 112 MMHG | TEMPERATURE: 98 F | OXYGEN SATURATION: 98 % | BODY MASS INDEX: 26.65 KG/M2 | RESPIRATION RATE: 16 BRPM | WEIGHT: 156.1 LBS

## 2025-04-08 DIAGNOSIS — K74.60 CIRRHOSIS OF LIVER WITHOUT ASCITES, UNSPECIFIED HEPATIC CIRRHOSIS TYPE (H): ICD-10-CM

## 2025-04-08 DIAGNOSIS — K75.4 AUTOIMMUNE HEPATITIS (H): Primary | ICD-10-CM

## 2025-04-08 DIAGNOSIS — K75.4 AUTOIMMUNE HEPATITIS (H): ICD-10-CM

## 2025-04-08 DIAGNOSIS — K74.60 CIRRHOSIS OF LIVER WITHOUT ASCITES, UNSPECIFIED HEPATIC CIRRHOSIS TYPE (H): Primary | ICD-10-CM

## 2025-04-08 LAB
ALBUMIN SERPL BCG-MCNC: 3.8 G/DL (ref 3.5–5.2)
ALP SERPL-CCNC: 86 U/L (ref 40–150)
ALT SERPL W P-5'-P-CCNC: 43 U/L (ref 0–50)
AST SERPL W P-5'-P-CCNC: 47 U/L (ref 0–45)
BILIRUB DIRECT SERPL-MCNC: 0.67 MG/DL (ref 0–0.3)
BILIRUB DIRECT SERPL-MCNC: 0.72 MG/DL (ref 0–0.3)
BILIRUB SERPL-MCNC: 1.5 MG/DL
ERYTHROCYTE [DISTWIDTH] IN BLOOD BY AUTOMATED COUNT: 15.7 % (ref 10–15)
HCT VFR BLD AUTO: 38 % (ref 35–47)
HGB BLD-MCNC: 13.4 G/DL (ref 11.7–15.7)
IGG SERPL-MCNC: 1625 MG/DL (ref 610–1616)
INR PPP: 1.36 (ref 0.85–1.15)
MCH RBC QN AUTO: 37.7 PG (ref 26.5–33)
MCHC RBC AUTO-ENTMCNC: 35.3 G/DL (ref 31.5–36.5)
MCV RBC AUTO: 107 FL (ref 78–100)
PLATELET # BLD AUTO: 79 10E3/UL (ref 150–450)
PROT SERPL-MCNC: 7.4 G/DL (ref 6.4–8.3)
RBC # BLD AUTO: 3.55 10E6/UL (ref 3.8–5.2)
WBC # BLD AUTO: 3.8 10E3/UL (ref 4–11)

## 2025-04-08 PROCEDURE — 85610 PROTHROMBIN TIME: CPT | Performed by: PATHOLOGY

## 2025-04-08 PROCEDURE — 3078F DIAST BP <80 MM HG: CPT | Performed by: INTERNAL MEDICINE

## 2025-04-08 PROCEDURE — 36415 COLL VENOUS BLD VENIPUNCTURE: CPT | Performed by: PATHOLOGY

## 2025-04-08 PROCEDURE — 99215 OFFICE O/P EST HI 40 MIN: CPT | Performed by: INTERNAL MEDICINE

## 2025-04-08 PROCEDURE — 80076 HEPATIC FUNCTION PANEL: CPT | Performed by: PATHOLOGY

## 2025-04-08 PROCEDURE — 99000 SPECIMEN HANDLING OFFICE-LAB: CPT | Performed by: PATHOLOGY

## 2025-04-08 PROCEDURE — 99213 OFFICE O/P EST LOW 20 MIN: CPT | Performed by: INTERNAL MEDICINE

## 2025-04-08 PROCEDURE — 82107 ALPHA-FETOPROTEIN L3: CPT | Mod: 90 | Performed by: PATHOLOGY

## 2025-04-08 PROCEDURE — 82784 ASSAY IGA/IGD/IGG/IGM EACH: CPT | Performed by: INTERNAL MEDICINE

## 2025-04-08 PROCEDURE — 3074F SYST BP LT 130 MM HG: CPT | Performed by: INTERNAL MEDICINE

## 2025-04-08 PROCEDURE — G2211 COMPLEX E/M VISIT ADD ON: HCPCS | Performed by: INTERNAL MEDICINE

## 2025-04-08 PROCEDURE — 74183 MRI ABD W/O CNTR FLWD CNTR: CPT | Performed by: RADIOLOGY

## 2025-04-08 PROCEDURE — A9585 GADOBUTROL INJECTION: HCPCS | Mod: JZ | Performed by: RADIOLOGY

## 2025-04-08 PROCEDURE — 83951 ONCOPROTEIN DCP: CPT | Mod: 90 | Performed by: PATHOLOGY

## 2025-04-08 PROCEDURE — 85027 COMPLETE CBC AUTOMATED: CPT | Performed by: PATHOLOGY

## 2025-04-08 RX ORDER — CARVEDILOL 6.25 MG/1
6.25 TABLET ORAL 2 TIMES DAILY WITH MEALS
Qty: 90 TABLET | Refills: 3 | Status: SHIPPED | OUTPATIENT
Start: 2025-04-08

## 2025-04-08 RX ORDER — AZATHIOPRINE 50 MG/1
150 TABLET ORAL DAILY
Qty: 90 TABLET | Refills: 3 | Status: SHIPPED | OUTPATIENT
Start: 2025-04-08

## 2025-04-08 RX ORDER — GADOBUTROL 604.72 MG/ML
7.5 INJECTION INTRAVENOUS ONCE
Status: COMPLETED | OUTPATIENT
Start: 2025-04-08 | End: 2025-04-08

## 2025-04-08 RX ORDER — PREDNISONE 1 MG/1
1 TABLET ORAL DAILY
Qty: 14 TABLET | Refills: 0 | Status: SHIPPED | OUTPATIENT
Start: 2025-04-08 | End: 2025-04-22

## 2025-04-08 RX ADMIN — GADOBUTROL 7.5 ML: 604.72 INJECTION INTRAVENOUS at 07:44

## 2025-04-08 NOTE — DISCHARGE INSTRUCTIONS
MRI Contrast Discharge Instructions    The IV contrast you received today will pass out of your body in your  urine. This will happen in the next 24 hours. You will not feel this process.  Your urine will not change color.    Drink at least 4 extra glasses of water or juice today (unless your doctor  has restricted your fluids). This reduces the stress on your kidneys.  You may take your regular medicines.    If you are on dialysis: It is best to have dialysis today.    If you have a reaction: Most reactions happen right away. If you have  any new symptoms after leaving the hospital (such as hives or swelling),  call your hospital at the correct number below. Or call your family doctor.  If you have breathing distress or wheezing, call 911.    Special instructions: ***    I have read and understand the above information.    Signature:______________________________________ Date:___________    Staff:__________________________________________ Date:___________     Time:__________    [facility] Departments:    ___[place]: [phone]  ___[facility]: [phone]  ___[place]: [phone] ___[facility]: [phone]  ___[facility]: [phone]  ___[facility]: [phone]  ___[phone]  ___[facility]: [phone]  ___[phone]

## 2025-04-08 NOTE — LETTER
4/8/2025      Ava Miranda  [address]  [facility] [address]      Dear Colleague,    Thank you for referring your patient, Ava Miranda, to the [facility] [place]. Please see a copy of my visit note below.    [facility]    Assessment  59 year old female with PMHx of compensated AIH related cirrhosis. PMHx of type II diabetes and HTN    #. Compensated autoimmune related hepatitis cirrhosis  MELD 3.0: 10    Patient with compensated AIH related cirrhosis.      Patient with no history of variceal bleeding.  She is up-to-date on variceal screening and she is currently on goal-directed medical therapy for portal hypertension.  For repeat upper endoscopy in January 2026 does not demonstrate any ongoing large varices could consider stopping EGDs and continuing carvedilol.    She is currently on azathioprine and prednisone for AIH management. She has mildly elevated AST and ALT in the setting of her overweight status.  I do not think that the prednisone is doing much for her given she has cirrhosis thus we will plan to slowly wean off her prednisone.     #. 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 Given this, she is not a candidate for MELD exception points for consideration of liver transplantation.  MRI today with several less than 1 cm areas / LR-3    Plan  -- Continue azathioprine 150mg daily   -- Slowly wean prednisone: 1 mg x 2 weeks and then off.  During this tapering phase plan for hepatic panel every 2 weeks x 3 occasions   * IgG every 3 months   * Given increased risk of skin cancer, recommend yearly skin cancer exams    * CBC every 3 months while on azathioprine   -- Carvedilol 6.25mg BID  -- MRI plus alpha-fetoprotein in 6 months given LR-3s all < 1cm on today's exam   -- Variceal Screening next due 3/2026 -if no significant varices noted on this exam and able to tolerate ongoing carvedilol 6.25 mg twice daily  then can stop variceal screening  -- Vitamin D + calcium supplementation for osteopenia     Health Maintenance/Other  -- Recommend yearly influenza vaccination   -- Recommend dental visits every 6 months  -- Colon Cancer Screening: due 2027  -- Recommend low sodium (< 2 grams per day) + high protein + low carbohydrate    RTC: 6 months    Bethany Gonzalez MD (Lizzie)  Advanced & Transplant Hepatology  [facility]    I spent 40 minutes on this encounter performing the following: reviewing the patient's medical record (clinic visits, hospital records, lab results, imaging and procedural documentation), history taking, physical exam and documentation on the date of the encounter. I also spent part of the time in coordination of care and counseling.    The longitudinal plan of care for the diagnosis(es)/condition(s) as documented were addressed during this visit. Due to the added complexity in care, I will continue to support Ava in the subsequent management and with ongoing continuity of care.    HPI:  AIH Cirrhosis  - dx ~ 5/2024  - no hx HE  - no hx ascites  - no hx variceal bleed  - last EGD 3/2025: grade I EV  - TPMT: normal 2023    Liver Lesion: Not confirmed HCC, LR-4  - 8/12/2024: TARE/Y90 to segment 4  - 12/6/2024 pathology review at [facility]: Chronic hepatitis with mild activity and established cirrhosis. Negative for neoplasm.     History:   - Patient was diagnosed with autoimmune hepatitis in February 2023.  At that time, liver biopsy showed stage III-IV fibrosis.  Patient was started on azathioprine and monitored clinically.    - May 2024, abdominal ultrasound showed a 2 cm lesion in the left lobe of the liver.    - 5/29/2024: MRI liver on 5/29/2024 2.2 cm LI-RADS-4 lesion in segment 4.  - 6/14/2024: Liver (?lesion) biopsy showed cirrhosis and no evidence of HCC  - 8/12/2024: TARE/Y90 to segment 4    Patient presented with her .    She reports doing generally well.  She is  currently on leave from work.  She was able to go out to the [place] for about a month and was able to disconnect from her phone which felt good.  She has connected with a psychotherapist [facility] [employer] and feels good about that relationship.  Her biggest dresser at this time seems to be work.    She has ongoing issues with sleep.  She has not ever been able to sleep normally.  She sometimes will have difficulty falling asleep but most of the time she will wake up after 4 hours and then not be able to go back to sleep.  She used to take Benadryl for sleep but has stopped taking any supplements or medications over-the-counter because of her liver disease.    She thinks she has gained 20 pounds over the last couple of years.  She restarted weight watchers 1 week ago.  She thinks she is down 7 pounds already.  Her goal is 135 pounds.    She is adherent with her azathioprine 150 mg/day and prednisone 2.5 mg/day    Never started rifaximin due to high cost. No concerning features for HE.     Medical hx Surgical hx   Past Medical History:   Diagnosis Date     Autoimmune hepatitis (H) 02/2023     Cirrhosis (H) 06/14/2024     History of basal cell carcinoma      Hypertension      Motion sickness      Osteoarthritis      Osteopenia      PONV (postoperative nausea and vomiting)      Squamous cell carcinoma of skin of face      Type 2 diabetes mellitus (H)       Past Surgical History:   Procedure Laterality Date     CYSTOSCOPY N/A 01/14/2022    Procedure: CYSTOSCOPY;  Surgeon: Bethany Anderson DO;  Location: [facility] OR     DILATION AND CURETTAGE, OPERATIVE HYSTEROSCOPY, COMBINED N/A 11/15/2021    Procedure: HYSTEROSCOPY, WITH DILATION AND CURETTAGE OF UTERUS;  Surgeon: Ping Hutchins MD;  Location: [facility] OR     LAPAROSCOPIC HYSTERECTOMY TOTAL Bilateral 01/14/2022    Procedure: TOTAL LAPAROSCOPIC HYSTERECTOMY, BILATERAL SALPINGO-OPHORECTOMY,;  Surgeon: Bethany Anderson DO;  Location:  [facility] OR     ROTATOR CUFF REPAIR RT/LT Right      TONSILLECTOMY       WISDOM TOOTH EXTRACTION            Medications  Current Outpatient Medications   Medication Sig Dispense Refill     azaTHIOprine (IMURAN) 50 MG tablet Take 3 tablets (150 mg) by mouth daily. 90 tablet 3     calcium carbonate (OS-ASH) 500 MG tablet Take 1 tablet (500 mg) by mouth 2 times daily. 90 tablet 3     carvedilol (COREG) 6.25 MG tablet Take 1 tablet (6.25 mg) by mouth 2 times daily (with meals). 90 tablet 3     lisinopril (ZESTRIL) 20 MG tablet Take 20 mg by mouth daily        metFORMIN (GLUCOPHAGE-XR) 500 MG 24 hr tablet Take 2,000 mg by mouth daily (with dinner).       multivitamin w/minerals (THERA-VIT-M) tablet Take 1 tablet by mouth daily.       omeprazole (PRILOSEC) 40 MG DR capsule Take 40 mg by mouth daily.       predniSONE (DELTASONE) 1 MG tablet Take 1 tablet (1 mg) by mouth daily for 14 days. 14 tablet 0     vitamin D3 (CHOLECALCIFEROL) 50 mcg (2000 units) tablet Take 1 tablet (50 mcg) by mouth daily. 90 tablet 3         Allergies  Allergies   Allergen Reactions     Latex Hives     Penicillins Unknown and Other (See Comments)     Annotation: unsure of reaction    Since childhood   Other reaction(s): *Unknown   Annotation: unsure of reaction        Annotation: unsure of reaction     Since childhood   Other reaction(s): *Unknown   Annotation: unsure of reaction      Annotation: unsure of reaction     Since childhood   Other reaction(s): *Unknown   Annotation: unsure of reaction       Family hx Social hx   Family History   Problem Relation Age of Onset     Coronary Artery Disease Mother      Lymphoma Mother      Diabetes Father      Kidney Cancer Father      Testicular cancer Brother      Coronary Artery Disease Maternal Grandmother      Cerebrovascular Disease Paternal Grandfather      Cirrhosis Maternal Cousin      Alcoholism Maternal Cousin      Cirrhosis Maternal Uncle      Alcoholism Maternal Uncle      Colon Cancer  Maternal Uncle 34      Social History     Tobacco Use     Smoking status: Never     Smokeless tobacco: Never   Vaping Use     Vaping status: Never Used   Substance Use Topics     Alcohol use: Not Currently     Comment: Last drink 10/2022     Drug use: Never     - Employment:  [employer]  -  to Christopher  - Two children  - Alcohol: Patient does not drink alcohol.  She last drank alcohol ~2022 when she was diagnosed with autoimmune hepatitis.  Prior to this, patient would drink 1 glass of wine per day.  Patient has no history of AUD or DUIs.  - Tobacco: Never     Review of systems  A 10-point review of systems was negative.    Examination  /73 (BP Location: Right arm, Patient Position: Chair, Cuff Size: Adult Regular)   Pulse 60   Temp 98  F (36.7  C) (Oral)   Resp 16   Wt 70.8 kg (156 lb 1.6 oz)   SpO2 98%   BMI 26.65 kg/m     Body mass index is 26.65 kg/m .    Gen-NAD  Eye- No conjunctival icterus.   CVS- RRR, no murmurs  RS- CTA bilaterally  Abd- Overweight, soft, non-tender, non-distended   Extr- 2+ radial pulses bilaterally, no lower extremity edema bilaterally  MS- hands without clubbing  Neuro- A+Ox3  Skin- no jaundice. Dupytren's contracture on left  Psych- normal mood    Laboratory  BMP  Recent Labs   Lab Test 01/14/25  0748 10/29/24  0709 01/14/22  1410 01/14/22  1008    140  --   --    POTASSIUM 4.1 3.8  --   --    CHLORIDE 105 104  --   --    ASH 9.3 9.2  --   --    CO2 24 27  --   --    BUN 11.0 10.9  --   --    CR 0.53 0.59  --   --    * 179* 181* 129*     CBC  Recent Labs   Lab Test 04/08/25  0628 01/14/25  0748 10/29/24  0709   WBC 3.8* 4.0 4.3   RBC 3.55* 3.48* 3.43*   HGB 13.4 13.4 13.0   HCT 38.0 36.7 36.9   * 106* 108*   MCH 37.7* 38.5* 37.9*   MCHC 35.3 36.5 35.2   RDW 15.7* 16.1* 15.2*   PLT 79* 98* 94*     Liver Enzymes   Recent Labs   Lab Test 04/08/25  0628   PROTTOTAL 7.4   ALBUMIN 3.8   BILITOTAL 1.5*   ALKPHOS 86   AST 47*   ALT 43      INR   INR   Date  Value Ref Range Status   04/08/2025 1.36 (H) 0.85 - 1.15 Final         Radiology  MRI Abdomen 4/8/2025  1. Cirrhosis and evidence of portal hypertension.  2. Continued decreased size of embolization changes within the hepatic  segment 4 without evidence of local recurrence. LR-TR nonviable.  3. No significant change in numerous subcentimeter arterial enhancing  foci throughout the liver without signal abnormalities on additional  sequences.LR-3.  4. Based on this exam only, the patient is within Granville criteria.  5. Mild nonspecific gallbladder wall thickening. No cholelithiasis.    Endoscopy  EGD 3/19/2025  - Grade I esophageal varices.                          - Scar in the mid esophagus and in the distal                          esophagus.                          - Z-line regular, 36 cm from the incisors.                          - Portal hypertensive gastropathy.                          - Normal examined duodenum.                          - No specimens collected.       Colonoscopy 11/12/2024  - The examined portion of the ileum was normal.                          - Congested mucosa in the entire examined colon.                          - Stool in the entire examined colon.                          - One 6 mm polyp at the hepatic flexure, removed                          with a cold snare. Resected and retrieved. Clip was                          placed.                          - Two 2 to 3 mm polyps in the descending colon,                          removed with a jumbo cold forceps. Resected and                          retrieved.                          - One 7 mm polyp in the sigmoid colon, removed with                          a cold snare. Resected and retrieved. Clip was                          placed.                          - One 3 mm polyp in the sigmoid colon, removed with                          a jumbo cold forceps. Resected and retrieved. Clip                          was placed. Clip  : Knovel.                          - One 2 mm polyp in the rectum, removed with a                          jumbo cold forceps. Resected and retrieved. Clip                          was placed.                          - Non-bleeding internal hemorrhoids.                          - Excessive bleeding after polyp removal likely                          related to underlying liver disease.   Path: Tubular adenoma + Hyperplastic polyps            Again, thank you for allowing me to participate in the care of your patient.        Sincerely,        Bethany Gonzalez MD    Electronically signed

## 2025-04-10 LAB
ACARBOXYPROTHROMBIN SERPL-MCNC: 1.6 NG/ML
AFP L3 MFR SERPL: 8.7 %
AFP SERPL-MCNC: 10 NG/ML

## 2025-04-24 ENCOUNTER — LAB (OUTPATIENT)
Dept: LAB | Facility: CLINIC | Age: 60
End: 2025-04-24
Payer: COMMERCIAL

## 2025-04-24 DIAGNOSIS — K75.4 AUTOIMMUNE HEPATITIS (H): ICD-10-CM

## 2025-04-24 LAB
ALBUMIN SERPL BCG-MCNC: 3.8 G/DL (ref 3.5–5.2)
ALP SERPL-CCNC: 95 U/L (ref 40–150)
ALT SERPL W P-5'-P-CCNC: 47 U/L (ref 0–50)
AST SERPL W P-5'-P-CCNC: 47 U/L (ref 0–45)
BILIRUB DIRECT SERPL-MCNC: 0.76 MG/DL (ref 0–0.3)
BILIRUB SERPL-MCNC: 2.2 MG/DL
PROT SERPL-MCNC: 7 G/DL (ref 6.4–8.3)

## 2025-05-08 ENCOUNTER — LAB (OUTPATIENT)
Dept: LAB | Facility: OTHER | Age: 60
End: 2025-05-08
Payer: COMMERCIAL

## 2025-05-08 DIAGNOSIS — K75.4 AUTOIMMUNE HEPATITIS (H): ICD-10-CM

## 2025-05-08 LAB
ALBUMIN SERPL BCG-MCNC: 3.7 G/DL (ref 3.5–5.2)
ALP SERPL-CCNC: 104 U/L (ref 40–150)
ALT SERPL W P-5'-P-CCNC: 44 U/L (ref 0–50)
AST SERPL W P-5'-P-CCNC: 44 U/L (ref 0–45)
BILIRUB DIRECT SERPL-MCNC: 0.61 MG/DL (ref 0–0.3)
BILIRUB SERPL-MCNC: 1.8 MG/DL
PROT SERPL-MCNC: 7.1 G/DL (ref 6.4–8.3)

## 2025-05-08 PROCEDURE — 36415 COLL VENOUS BLD VENIPUNCTURE: CPT | Performed by: FAMILY MEDICINE

## 2025-05-08 PROCEDURE — 80076 HEPATIC FUNCTION PANEL: CPT | Performed by: FAMILY MEDICINE

## 2025-05-09 ENCOUNTER — RESULTS FOLLOW-UP (OUTPATIENT)
Dept: MULTI SPECIALTY CLINIC | Facility: CLINIC | Age: 60
End: 2025-05-09

## 2025-05-12 ASSESSMENT — ANXIETY QUESTIONNAIRES
1. FEELING NERVOUS, ANXIOUS, OR ON EDGE: SEVERAL DAYS
3. WORRYING TOO MUCH ABOUT DIFFERENT THINGS: SEVERAL DAYS
7. FEELING AFRAID AS IF SOMETHING AWFUL MIGHT HAPPEN: NOT AT ALL
5. BEING SO RESTLESS THAT IT IS HARD TO SIT STILL: SEVERAL DAYS
GAD7 TOTAL SCORE: 6
4. TROUBLE RELAXING: SEVERAL DAYS
IF YOU CHECKED OFF ANY PROBLEMS ON THIS QUESTIONNAIRE, HOW DIFFICULT HAVE THESE PROBLEMS MADE IT FOR YOU TO DO YOUR WORK, TAKE CARE OF THINGS AT HOME, OR GET ALONG WITH OTHER PEOPLE: SOMEWHAT DIFFICULT
2. NOT BEING ABLE TO STOP OR CONTROL WORRYING: SEVERAL DAYS
6. BECOMING EASILY ANNOYED OR IRRITABLE: SEVERAL DAYS

## 2025-05-13 ASSESSMENT — ANXIETY QUESTIONNAIRES
4. TROUBLE RELAXING: SEVERAL DAYS
7. FEELING AFRAID AS IF SOMETHING AWFUL MIGHT HAPPEN: NOT AT ALL
GAD7 TOTAL SCORE: 6
GAD7 TOTAL SCORE: 6
2. NOT BEING ABLE TO STOP OR CONTROL WORRYING: SEVERAL DAYS
6. BECOMING EASILY ANNOYED OR IRRITABLE: SEVERAL DAYS
8. IF YOU CHECKED OFF ANY PROBLEMS, HOW DIFFICULT HAVE THESE MADE IT FOR YOU TO DO YOUR WORK, TAKE CARE OF THINGS AT HOME, OR GET ALONG WITH OTHER PEOPLE?: SOMEWHAT DIFFICULT
1. FEELING NERVOUS, ANXIOUS, OR ON EDGE: SEVERAL DAYS
5. BEING SO RESTLESS THAT IT IS HARD TO SIT STILL: SEVERAL DAYS
7. FEELING AFRAID AS IF SOMETHING AWFUL MIGHT HAPPEN: NOT AT ALL
IF YOU CHECKED OFF ANY PROBLEMS ON THIS QUESTIONNAIRE, HOW DIFFICULT HAVE THESE PROBLEMS MADE IT FOR YOU TO DO YOUR WORK, TAKE CARE OF THINGS AT HOME, OR GET ALONG WITH OTHER PEOPLE: SOMEWHAT DIFFICULT
GAD7 TOTAL SCORE: 6
3. WORRYING TOO MUCH ABOUT DIFFERENT THINGS: SEVERAL DAYS

## 2025-05-16 ENCOUNTER — VIRTUAL VISIT (OUTPATIENT)
Dept: PSYCHOLOGY | Facility: CLINIC | Age: 60
End: 2025-05-16
Payer: COMMERCIAL

## 2025-05-16 DIAGNOSIS — F43.23 ADJUSTMENT DISORDER WITH MIXED ANXIETY AND DEPRESSED MOOD: Primary | ICD-10-CM

## 2025-05-16 NOTE — PROGRESS NOTES
Health Psychology Outpatient Follow Up  [facility]     Patient: Ava Miranda   Date of Service: 5/16/25  Treatment Plan Due: 7/29/25    Diagnosis:  Adjustment disorder with mixed anxiety and depressed mood (ICD-10: F43.20)    Patient Demographics (per chart):   Age: 59 year old  Biological Sex: female  Race: White  Ethnicity: Not  or     Subjective:  Engaged pt in psychotherapy (CBT) for coping with chronic illness. Reviewed updates to physical and emotional health since previous session and progress with treatment plan goals. Patient reported that she has been doing well since last appointment citing her official return to work having being gone.  Engage patient in supportive listening and cognitive processing of ongoing stressors and symptoms.  Patient reflected on return to work, reporting that she has felt very excited to be back at work but has also been cautious about how much workload she takes on with ongoing fatigue symptoms.  Patient reflected on difficulties related to saying yes or no to work-related tasks as work is an enjoyable and important domain of her personality and identity.  Engage patient in psychoeducation and skills training related to balanced thoughts and all or nothing thinking related to work tasks and engaged patient in goalsetting and problem-solving related to work related tasks including creating boundaries around how much work she is completing.  Additionally discussed how her social supports can be a useful resource during this time of work transition.    Objective   Appearance:   Appropriate   Eye Contact:   Good   Psychomotor Behavior:  Normal   Attitude:   Cooperative   Orientation:   All  Speech   Rate / Production: Normal    Volume:  Normal   Mood:    Euthymic  Affect:    Appropriate   Thought Content:   Clear  Thought Form:   Coherent  Logical     Insight:    Did not formally assess at this time.   Safety:    No  "safety concerns at this time.     Weight (per chart):  Wt Readings from Last 4 Encounters:   04/08/25 70.8 kg (156 lb 1.6 oz)   01/14/25 72.2 kg (159 lb 4 oz)   10/29/24 71.4 kg (157 lb 4.8 oz)   10/29/24 71.4 kg (157 lb 4.8 oz)        BMI (per chart):  Estimated body mass index is 26.65 kg/m  as calculated from the following:    Height as of 10/29/24: 1.63 m (5' 4.17\").    Weight as of 4/8/25: 70.8 kg (156 lb 1.6 oz).     Depression Symptoms (Patient Health Questionnaire 9; PHQ-9)  The PHQ-9 is a measure of depressive symptoms.  Scores on this measure range from 0 to 27 with higher scores reflecting greater levels and frequency of depressive symptoms. Typical symptom ranges include: 0-4 minimal, 5-9 mild, 10-14 moderate, 15-19 moderately severe, 20-27 severe.       1/16/2025     9:47 AM   PHQ-9 SCORE   PHQ-9 Total Score MyChart 12 (Moderate depression)   PHQ-9 Total Score 12        Patient-reported     Anxiety Symptoms (Generalized Anxiety Disorder 7; SHANEL-7)  The SHANEL-7 is a measure of anxiety and panic symptoms.  Scores on this measure range from 0 to 21 with higher scores reflecting greater levels and frequency of anxiety symptoms. Typical symptom ranges include: 0-4 minimal, 5-9 mild, 10-14 moderate, 15-21 severe.       4/13/2025     5:50 PM 5/12/2025     1:48 PM 5/13/2025    11:51 AM   SHANEL-7 SCORE   Total Score 4 (minimal anxiety)  6 (mild anxiety)   Total Score 4  6  6        Patient-reported     Alcohol and Drug Abuse (CAGE - Adapted to Include Drugs; CAGE-AID)  The CAGE-AID is a screening tool to assess for symptoms of alcohol or drug abuse or dependence. Scores on this measure range from 0 to 4, with higher scores reflecting experiences consistent with problem use.  CAGE-AID score  > 1 is a positive screen, suggesting further discussion is needed to determine if evaluation for alcohol or substance abuse is appropriate.  A score > 2 is considered clinically significant, suggesting further evaluation of " alcohol or substance-related problems is indicated.      1/14/2025     9:26 AM   CAGE-AID Total Score   Total Score 0    Total Score MyChart 0 (A total score of 2 or greater is considered clinically significant)       Patient-reported     Global Health (Patient-Reported Outcomes Measurement Information System; PROMIS-10)  The PROMIS-10 is a measure of global physical and mental health. Total scores on this measure range from 8 to 40, with higher scores reflecting greater levels of perceived health.       1/14/2025     9:26 AM 4/13/2025     5:52 PM   PROMIS-10 Scores Only   Global Mental Health Score 10  12    Global Physical Health Score 12  14    PROMIS TOTAL - SUBSCORES 22  26        Patient-reported     Suicidality ([place] Suicide Severity Rating Scale Screener Past Month)      1/14/2022     9:30 AM   [place] Suicide Severity Rating (Short Version)   Over the past 2 weeks have you felt down, depressed, or hopeless? no   Over the past 2 weeks have you had thoughts of killing yourself? no   Have you ever attempted to kill yourself? no       Assessment:  Patient was engaged in treatment. Patient appears to be experiencing depression symptoms in the moderate range and anxiety symptoms in the minimal range. Currently, the patient appears to be coping with some success. Progress towards treatment goals: ongoing.     Plan:  Return for therapy sessions.     Session Length:  Start Time: 2:00pm  End Time: 2:38pm    Modality: Telemedicine Information:  Type of service:   Telemedicine psychotherapy  Patient location:   Patient home in [place]    Patient telephone number: [phone]  Provider location:  [facility] 3 Health Psychology Office  Mode of Communication:   Video Conference via Vessel   Patient consent to telemedicine services? Yes  It has been determined that telemedicine service is appropriate and effective for this patient.   The patient has been notified that: Video visits will be conducted via a call  with their psychologist to provide the care they need with a video conversation. Video visits may be billed at different rates depending on insurance coverage.  Patients are advised to contact their insurance provider with any questions about their health insurance coverage. If during the course of a call the psychologist feels a video visit is not appropriate, patients will not be charged for this service.    Meño Hood, PhD,   Clinical Health Psychologist  Phone: [phone]    *This note was completed using Dragon voice recognition software. Although reviewed after completion, some word and grammatical errors may occur.

## 2025-06-28 ENCOUNTER — HEALTH MAINTENANCE LETTER (OUTPATIENT)
Age: 60
End: 2025-06-28

## 2025-07-14 ENCOUNTER — LAB (OUTPATIENT)
Dept: LAB | Facility: OTHER | Age: 60
End: 2025-07-14
Payer: COMMERCIAL

## 2025-07-14 DIAGNOSIS — K74.60 CIRRHOSIS OF LIVER WITHOUT ASCITES, UNSPECIFIED HEPATIC CIRRHOSIS TYPE (H): ICD-10-CM

## 2025-07-14 DIAGNOSIS — K75.4 AUTOIMMUNE HEPATITIS (H): ICD-10-CM

## 2025-07-14 LAB
ALBUMIN SERPL BCG-MCNC: 3.6 G/DL (ref 3.5–5.2)
ALP SERPL-CCNC: 95 U/L (ref 40–150)
ALT SERPL W P-5'-P-CCNC: 40 U/L (ref 0–50)
ANION GAP SERPL CALCULATED.3IONS-SCNC: 10 MMOL/L (ref 7–15)
AST SERPL W P-5'-P-CCNC: 44 U/L (ref 0–45)
BILIRUB DIRECT SERPL-MCNC: 0.78 MG/DL (ref 0–0.3)
BILIRUB SERPL-MCNC: 2 MG/DL
BUN SERPL-MCNC: 8.7 MG/DL (ref 8–23)
CALCIUM SERPL-MCNC: 9.1 MG/DL (ref 8.8–10.4)
CHLORIDE SERPL-SCNC: 104 MMOL/L (ref 98–107)
CREAT SERPL-MCNC: 0.51 MG/DL (ref 0.51–0.95)
EGFRCR SERPLBLD CKD-EPI 2021: >90 ML/MIN/1.73M2
ERYTHROCYTE [DISTWIDTH] IN BLOOD BY AUTOMATED COUNT: 15.1 % (ref 10–15)
GLUCOSE SERPL-MCNC: 182 MG/DL (ref 70–99)
HCO3 SERPL-SCNC: 26 MMOL/L (ref 22–29)
HCT VFR BLD AUTO: 35 % (ref 35–47)
HGB BLD-MCNC: 12.5 G/DL (ref 11.7–15.7)
INR PPP: 1.3 (ref 0.85–1.15)
MCH RBC QN AUTO: 38.9 PG (ref 26.5–33)
MCHC RBC AUTO-ENTMCNC: 35.7 G/DL (ref 31.5–36.5)
MCV RBC AUTO: 109 FL (ref 78–100)
PLATELET # BLD AUTO: 79 10E3/UL (ref 150–450)
POTASSIUM SERPL-SCNC: 4.4 MMOL/L (ref 3.4–5.3)
PROT SERPL-MCNC: 7 G/DL (ref 6.4–8.3)
PROTHROMBIN TIME: 16.3 SECONDS (ref 11.8–14.8)
RBC # BLD AUTO: 3.21 10E6/UL (ref 3.8–5.2)
SODIUM SERPL-SCNC: 140 MMOL/L (ref 135–145)
WBC # BLD AUTO: 2.6 10E3/UL (ref 4–11)

## 2025-07-14 PROCEDURE — 85027 COMPLETE CBC AUTOMATED: CPT | Performed by: FAMILY MEDICINE

## 2025-07-14 PROCEDURE — 82248 BILIRUBIN DIRECT: CPT | Performed by: FAMILY MEDICINE

## 2025-07-14 PROCEDURE — 82784 ASSAY IGA/IGD/IGG/IGM EACH: CPT | Performed by: FAMILY MEDICINE

## 2025-07-14 PROCEDURE — 36415 COLL VENOUS BLD VENIPUNCTURE: CPT | Performed by: FAMILY MEDICINE

## 2025-07-14 PROCEDURE — 85610 PROTHROMBIN TIME: CPT | Performed by: FAMILY MEDICINE

## 2025-07-14 PROCEDURE — 80053 COMPREHEN METABOLIC PANEL: CPT | Performed by: FAMILY MEDICINE

## 2025-07-16 LAB — IGG SERPL-MCNC: 1698 MG/DL (ref 610–1616)

## 2025-07-22 ENCOUNTER — TELEPHONE (OUTPATIENT)
Dept: GASTROENTEROLOGY | Facility: CLINIC | Age: 60
End: 2025-07-22
Payer: COMMERCIAL

## 2025-07-22 NOTE — TELEPHONE ENCOUNTER
Patient confirmed scheduled appointment:     Date: 10/8/25  Time: 11:00 am  Appointment Type: Return Liver  Provider: Dr. Bethany Gonzalez  Location: [facility]  Testing/imaging: Lab & MRI  Additional Notes:

## (undated) DEVICE — SOL WATER IRRIG 1000ML BOTTLE 2F7114

## (undated) DEVICE — CATH FOLEY 5CC 16FR SIL/LTX 0165V16S

## (undated) DEVICE — CUSTOM PACK PELVISCOPY SMA5BPVHEA

## (undated) DEVICE — GLOVE BIOGEL PI ULTRATOUCH G SZ 6.5 42165

## (undated) DEVICE — APPLICATOR ENDOSCOPIC 5 SURGICEL POWDER 3123SPEA

## (undated) DEVICE — PREP SCRUB SOL EXIDINE 4% CHG 4OZ 29002-404

## (undated) DEVICE — TUBING LAP SUCT/IRRIG STRYKER 250070500

## (undated) DEVICE — TUBING SMOKE EVAC PNEUMOCLEAR HIGH FLOW 0620050250

## (undated) DEVICE — TROCAR ADV FIXATION NONBLADED 5X100MM

## (undated) DEVICE — ENDO TROCAR SLEEVE KII ADV FIXATION 05X100MM CFS02

## (undated) DEVICE — GLOVE SURG PI ULTRA TOUCH M SZ 6-1/2 LF

## (undated) DEVICE — GLOVE UNDER INDICATOR PI SZ 6.5 LF 41665

## (undated) DEVICE — ADH SKIN CLOSURE PREMIERPRO EXOFIN 1.0ML 3470

## (undated) DEVICE — SU MONOCRYL+ 4-0 18IN PS2 UND MCP496G

## (undated) DEVICE — PAD POS XL 1X20X40IN PINK PIGAZZI

## (undated) DEVICE — SYR 10ML FINGER CONTROL W/O NDL 309695

## (undated) DEVICE — ESU LIGASURE SEALER/DIVIDER RETRACT L-HOOK 37CM LF5637

## (undated) DEVICE — SU STRATAFIX PDS PLUS 0 CT-1 30CM SXPP1B450

## (undated) DEVICE — SU WND CLOSURE RELOAD VLOC 180 ABS 0 GREEN 8" VLOCA008L

## (undated) DEVICE — DEVICE ENDO STITCH APPLIER 10MM 173016

## (undated) DEVICE — DRSG GAUZE 4X4" 8044

## (undated) DEVICE — PLATE GROUNDING ADULT W/CORD 9165L

## (undated) DEVICE — SYRINGE 10ML FILL SALINE FLUSH STERILE 306553

## (undated) DEVICE — SOLUTION IV 2B0304X STRL WATER 1000ML

## (undated) DEVICE — TUBING IRRIG TUR Y TYPE 96" LF 6543-01

## (undated) DEVICE — ENDO SHEARS RENEW LAP ENDOCUT SCISSOR TIP 16.5MM 3142

## (undated) DEVICE — SOL WATER IRRIG 3000ML BAG 2B7117

## (undated) DEVICE — ENDO TROCAR FIRST ENTRY KII FIOS Z-THRD 11X100MM CTF33

## (undated) DEVICE — DRAPE UNDER BUTTOCK 89415

## (undated) DEVICE — RETR ELEV / UTERINE MANIPULATOR V-CARE MED CUP 60-6085-201A

## (undated) DEVICE — Device

## (undated) DEVICE — GOWN LG DISP 9515

## (undated) DEVICE — TUBING SUCTION MEDI-VAC 1/4"X20' N620A - HE

## (undated) DEVICE — DRSG TEGADERM 2 3/8X2 3/4" 1624W

## (undated) DEVICE — SU VICRYL+ 0 27 UR6 VLT VCP603H

## (undated) DEVICE — SYSTEM CLEARIFY VISUALIZATION 21-345

## (undated) DEVICE — PREP CHLORAPREP 26ML TINTED HI-LITE ORANGE 930815

## (undated) DEVICE — BRIEF STRETCH XL MPS40

## (undated) DEVICE — DRAPE POUCH INSTRUMENT 3 POCKET 1018L